# Patient Record
Sex: FEMALE | Race: WHITE | HISPANIC OR LATINO
[De-identification: names, ages, dates, MRNs, and addresses within clinical notes are randomized per-mention and may not be internally consistent; named-entity substitution may affect disease eponyms.]

---

## 2018-05-10 ENCOUNTER — TRANSCRIPTION ENCOUNTER (OUTPATIENT)
Age: 38
End: 2018-05-10

## 2018-12-05 ENCOUNTER — TRANSCRIPTION ENCOUNTER (OUTPATIENT)
Age: 38
End: 2018-12-05

## 2019-03-10 ENCOUNTER — TRANSCRIPTION ENCOUNTER (OUTPATIENT)
Age: 39
End: 2019-03-10

## 2019-10-23 ENCOUNTER — TRANSCRIPTION ENCOUNTER (OUTPATIENT)
Age: 39
End: 2019-10-23

## 2020-12-07 ENCOUNTER — TRANSCRIPTION ENCOUNTER (OUTPATIENT)
Age: 40
End: 2020-12-07

## 2020-12-12 ENCOUNTER — EMERGENCY (EMERGENCY)
Facility: HOSPITAL | Age: 40
LOS: 1 days | Discharge: HOME | End: 2020-12-12
Attending: EMERGENCY MEDICINE | Admitting: EMERGENCY MEDICINE
Payer: COMMERCIAL

## 2020-12-12 VITALS
OXYGEN SATURATION: 99 % | HEART RATE: 77 BPM | SYSTOLIC BLOOD PRESSURE: 126 MMHG | TEMPERATURE: 99 F | DIASTOLIC BLOOD PRESSURE: 76 MMHG | RESPIRATION RATE: 18 BRPM

## 2020-12-12 PROCEDURE — 73000 X-RAY EXAM OF COLLAR BONE: CPT | Mod: 26,LT

## 2020-12-12 PROCEDURE — 99283 EMERGENCY DEPT VISIT LOW MDM: CPT

## 2020-12-12 PROCEDURE — 73030 X-RAY EXAM OF SHOULDER: CPT | Mod: 26,LT

## 2020-12-12 PROCEDURE — 71101 X-RAY EXAM UNILAT RIBS/CHEST: CPT | Mod: 26,LT

## 2020-12-12 RX ORDER — ACETAMINOPHEN 500 MG
975 TABLET ORAL ONCE
Refills: 0 | Status: COMPLETED | OUTPATIENT
Start: 2020-12-12 | End: 2020-12-12

## 2020-12-12 RX ORDER — LIDOCAINE 4 G/100G
1 CREAM TOPICAL ONCE
Refills: 0 | Status: COMPLETED | OUTPATIENT
Start: 2020-12-12 | End: 2020-12-12

## 2020-12-12 RX ADMIN — LIDOCAINE 1 PATCH: 4 CREAM TOPICAL at 16:13

## 2020-12-12 RX ADMIN — Medication 975 MILLIGRAM(S): at 16:20

## 2020-12-12 NOTE — ED ADULT TRIAGE NOTE - CHIEF COMPLAINT QUOTE
Patient tripped and fell last Sunday and injured herself and patient has c/o back and bilateral upper shoulders.

## 2020-12-12 NOTE — ED PROVIDER NOTE - PATIENT PORTAL LINK FT
You can access the FollowMyHealth Patient Portal offered by [facility] by registering at the following website: [email or web address]. By joining [employer]’s FollowMyHealth portal, you will also be able to view your health information using other applications (apps) compatible with our system.

## 2020-12-12 NOTE — ED PROVIDER NOTE - NSFOLLOWUPINSTRUCTIONS_ED_ALL_ED_FT
Follow with your PMD within 48-72 hours.  Rest, no heavy lifting.  Warm compresses to area. Recommend Ortho consult to discuss possible MRI vs Physical Therapy- referral list provided.  Light walking. Take Motrin 600 mg every 8 hours for pain with food, may alternate with over the coutner tylenol as directed for pain. Any worsening pain, weakness, numbness, bowel or urinary incontinence return to ER

## 2020-12-12 NOTE — ED PROVIDER NOTE - NS CPE EDP MUSC CERVICAL LOC
no midline ttp, no deformity, + left paraspinal muscle ttp, FROM with reproducible mild pain on movement

## 2020-12-12 NOTE — ED PROVIDER NOTE - UPPER EXTREMITY EXAM, LEFT
TENDERNESS/no obvious swelling, erythema, or deformity, + bony TTP to upper anterior left sided ribs, shoulder, and clavicle, FROM shoulder/eblow/wrist, sensation and strength intact

## 2020-12-12 NOTE — ED ADULT NURSE REASSESSMENT NOTE - NS ED NURSE REASSESS COMMENT FT1
Intake pt stated fell at home on a wet floor few days ago, injured her left upper back, pt AOX 3 ambulatory to ER, medicated as ordered, pending dispo.       Pia Bass RN

## 2020-12-12 NOTE — ED PROVIDER NOTE - ATTENDING CONTRIBUTION TO CARE
I performed a face to face evaluation of this patient and obtained a history and performed a full exam.  I agree with the history, physical exam and plan of the PA.    Brief HPI:  41 y/o F PMH hypothyroidism c/o left sided shoulder/upper back pain s/p mechanical slip and fall 6 days ago.  Patient states that she sustained impact to her right side of body but has been having left upper back pain since incident.      Vitals:   Reviewed    Primary survey:   A:  airway intact, normal voice  B:  breath sounds clear bilaterally, symmetric chest rise, no flail segment  C:  peripheral pulses 2+ and symmetric, brisk capillary refill     Secondary Survey:    Gen:  AAOx3, non-toxic appearing, non-diaphoretic, speaking full sentences   Scalp:  no skull depression, hematoma, or laceration   Face:  no hematoma or laceration, non-tender sinuses and forehead, no mobile segments   Eyes:  no orbital swelling or tenderness bilaterally, no conjunctival injection, no proptosis, EOMI without pain, PERRL, no visual field abnormalities, no raccoon eyes   Nose:  non-swollen, non-tender, nares clear, no septal hematoma, no rhinorrhea   Ears:  no deformity or ecchymoses, no hemotympanum b/l, external auditory canals clear b/l, no carranza's sign, no otorrhea   Mouth:  lips and tongue normal appearing, teeth normal appearing, uvula midline, mucosa normal appearing without any laceration or visible bleeding   NECK:  no stridor, normal voice, no jvd, neck supple, no skin laceration   CV:  RRR, s1/s2, no murmur, rubs or gallops, peripheral pulses 2+ and symmetric, no JVD  PULM:  symmetric chest rise, lungs ctab, no wheeze, crackles, or rales, chest wall non-tender   ABD:  non-distended, non-tender, no rebound or guarding, no ecchymoses, no flank ecchymoses, no fluid shift   Spine:  no c/t/l spine tenderness or stepoff, left lower thoracic paraspinal tenderness   EXT:  no deformity, non-tender joints or extremities, rom grossly intact, warm to touch   Axilla:  no lacerations or wounds  NEURO:  AAOx3, GCS 15, motor 5/5 in ue and le b/l, sensation grossly intact in extremities and trunk, no gait deficit   SKIN:  warm, dry, no lacerations or ecchymoses     A/P:  41 y/o F PMH hypothyroidism c/o left sided shoulder/upper back pain s/p mechanical slip and fall 6 days ago.  VSS.  Exam with tenderness to palpation left thoracic paraspinal area.  Low c/f vertebral fracture or cord injury.  Plan for x-rays, pain control.  Dispo pending.

## 2020-12-12 NOTE — ED PROVIDER NOTE - CLINICAL SUMMARY MEDICAL DECISION MAKING FREE TEXT BOX
pt with pain to left upper chest/shoulder/back s/p fall; no neuro deficits on exam; will r/o fx to ribs/clavicle/shoulder and give pain control and reassess

## 2020-12-12 NOTE — ED PROVIDER NOTE - OBJECTIVE STATEMENT
41 y/o F PMH hypothyroidism c/o left sided shoulder/upper back pain s/p mechanical slip and fall 6 days ago. Pain radiates down lue and left back at times. Pt states she was cleaning kitchen floor when she fell, landing on her right side, without head trauma. Pt states she followed up with urgent care who rx'd her muscle relaxer but was unable to perform xray at the time. Denies fever, chills, HA, CP, SOB, cough, abdominal pain, N/V, numbness/weakness, bladder/bowel dysfunction.

## 2021-09-10 ENCOUNTER — TRANSCRIPTION ENCOUNTER (OUTPATIENT)
Age: 41
End: 2021-09-10

## 2021-10-08 ENCOUNTER — TRANSCRIPTION ENCOUNTER (OUTPATIENT)
Age: 41
End: 2021-10-08

## 2021-10-20 ENCOUNTER — TRANSCRIPTION ENCOUNTER (OUTPATIENT)
Age: 41
End: 2021-10-20

## 2021-10-22 ENCOUNTER — TRANSCRIPTION ENCOUNTER (OUTPATIENT)
Age: 41
End: 2021-10-22

## 2021-10-29 ENCOUNTER — TRANSCRIPTION ENCOUNTER (OUTPATIENT)
Age: 41
End: 2021-10-29

## 2021-11-05 ENCOUNTER — TRANSCRIPTION ENCOUNTER (OUTPATIENT)
Age: 41
End: 2021-11-05

## 2021-11-12 ENCOUNTER — TRANSCRIPTION ENCOUNTER (OUTPATIENT)
Age: 41
End: 2021-11-12

## 2021-11-17 ENCOUNTER — TRANSCRIPTION ENCOUNTER (OUTPATIENT)
Age: 41
End: 2021-11-17

## 2021-11-24 ENCOUNTER — TRANSCRIPTION ENCOUNTER (OUTPATIENT)
Age: 41
End: 2021-11-24

## 2021-11-26 ENCOUNTER — TRANSCRIPTION ENCOUNTER (OUTPATIENT)
Age: 41
End: 2021-11-26

## 2021-12-04 ENCOUNTER — TRANSCRIPTION ENCOUNTER (OUTPATIENT)
Age: 41
End: 2021-12-04

## 2022-01-10 ENCOUNTER — TRANSCRIPTION ENCOUNTER (OUTPATIENT)
Age: 42
End: 2022-01-10

## 2022-04-06 PROBLEM — E03.9 HYPOTHYROIDISM, UNSPECIFIED: Chronic | Status: ACTIVE | Noted: 2020-12-12

## 2022-05-06 ENCOUNTER — APPOINTMENT (OUTPATIENT)
Dept: NEPHROLOGY | Facility: CLINIC | Age: 42
End: 2022-05-06
Payer: COMMERCIAL

## 2022-05-06 ENCOUNTER — LABORATORY RESULT (OUTPATIENT)
Age: 42
End: 2022-05-06

## 2022-05-06 DIAGNOSIS — Z78.9 OTHER SPECIFIED HEALTH STATUS: ICD-10-CM

## 2022-05-06 PROCEDURE — 93000 ELECTROCARDIOGRAM COMPLETE: CPT

## 2022-05-06 PROCEDURE — 99204 OFFICE O/P NEW MOD 45 MIN: CPT | Mod: 25

## 2022-05-06 PROCEDURE — 36415 COLL VENOUS BLD VENIPUNCTURE: CPT

## 2022-05-06 RX ORDER — NORETHINDRONE ACETATE AND ETHINYL ESTRADIOL 1.5; 3 MG/1; UG/1
1.5-3 TABLET ORAL DAILY
Qty: 90 | Refills: 0 | Status: ACTIVE | COMMUNITY
Start: 2022-05-06

## 2022-05-06 NOTE — ASSESSMENT
[FreeTextEntry1] : # SOB.\par * Check labs.\par * Pulm referral. They were instructed that this referral is important for their health and that it is their responsibility to make and keep this appointment. All their questions were answered. \par * Check EKG (NSR, no STT abnl.). She will make appointment to be evaluated by cardiologist. They were instructed that this referral is important for their health and that it is their responsibility to make and keep this appointment. All their questions were answered.\par * Follow up in 1 month. \par \par # Hypothyroid.\par * TSH.\par \par #  Overweight.\par * Weight loss.\par \par # Snoring.\par * Sleep study eval.

## 2022-05-06 NOTE — PHYSICAL EXAM
[General Appearance - Alert] : alert [General Appearance - In No Acute Distress] : in no acute distress [Neck Appearance] : the appearance of the neck was normal [Neck Cervical Mass (___cm)] : no neck mass was observed [Jugular Venous Distention Increased] : there was no jugular-venous distention [Thyroid Diffuse Enlargement] : the thyroid was not enlarged [Thyroid Nodule] : there were no palpable thyroid nodules [Auscultation Breath Sounds / Voice Sounds] : lungs were clear to auscultation bilaterally [Heart Rate And Rhythm] : heart rate was normal and rhythm regular [Heart Sounds] : normal S1 and S2 [Heart Sounds Gallop] : no gallops [Murmurs] : no murmurs [Heart Sounds Pericardial Friction Rub] : no pericardial rub [Edema] : there was no peripheral edema [Bowel Sounds] : normal bowel sounds [Abdomen Soft] : soft [Abdomen Tenderness] : non-tender [] : no hepato-splenomegaly [Abdomen Mass (___ Cm)] : no abdominal mass palpated [Cervical Lymph Nodes Enlarged Posterior Bilaterally] : posterior cervical [Cervical Lymph Nodes Enlarged Anterior Bilaterally] : anterior cervical [Supraclavicular Lymph Nodes Enlarged Bilaterally] : supraclavicular [Axillary Lymph Nodes Enlarged Bilaterally] : axillary [Femoral Lymph Nodes Enlarged Bilaterally] : femoral [Inguinal Lymph Nodes Enlarged Bilaterally] : inguinal

## 2022-05-06 NOTE — HISTORY OF PRESENT ILLNESS
[FreeTextEntry1] : Previous PCP: Dr. Kramer. . \par \par For 6 months she has noticed SOB with climbing steps at work (three flights). No cough or CP. \par This also happened two years ago and she was found to be iron deficient and severely anemic due to heavy periods. She follows with her hematologist every 6 months. She was treated empirically for asthma with albuterol  by previous PCP and she doesn't believe this helped. * Snoring. \par \par No heavy periods. On birth control. No smoking. No early CAD. \par \par She sees a cardiologist for a "slight heart murmur". She had an annual visit 5 months ago and she feels she told them of her shortnesss of breath. Stress test scheduled. \par \par Options for clinical preventative services\par \par Covid: Pfizer x 2 \par Influenza: No\par Pneumonia: \par Shingles:\par TDAP:\par Colonoscopy:\par Dermatologist:\par \par Breast/Pelvic Exam: 2021  by gyn \par Mammogram: 2021 by gyn\par Bone Density >65:\par \par HIV:\par Hep C:\par \par \par

## 2022-05-13 LAB
ALBUMIN SERPL ELPH-MCNC: 3.9 G/DL
ALP BLD-CCNC: 62 U/L
ALT SERPL-CCNC: 13 U/L
ANION GAP SERPL CALC-SCNC: 13 MMOL/L
APPEARANCE: ABNORMAL
AST SERPL-CCNC: 26 U/L
BASOPHILS # BLD AUTO: 0.02 K/UL
BASOPHILS NFR BLD AUTO: 0.4 %
BILIRUB SERPL-MCNC: <0.2 MG/DL
BILIRUBIN URINE: NEGATIVE
BLOOD URINE: ABNORMAL
BUN SERPL-MCNC: 11 MG/DL
CALCIUM SERPL-MCNC: 8.7 MG/DL
CHLORIDE SERPL-SCNC: 105 MMOL/L
CHOLEST SERPL-MCNC: 213 MG/DL
CO2 SERPL-SCNC: 21 MMOL/L
COLOR: ABNORMAL
CREAT SERPL-MCNC: 0.69 MG/DL
CREAT SPEC-SCNC: 132 MG/DL
EGFR: 110 ML/MIN/1.73M2
EOSINOPHIL # BLD AUTO: 0.11 K/UL
EOSINOPHIL NFR BLD AUTO: 2.4 %
ESTIMATED AVERAGE GLUCOSE: 111 MG/DL
FERRITIN SERPL-MCNC: 16 NG/ML
FOLATE SERPL-MCNC: 19.1 NG/ML
GLUCOSE QUALITATIVE U: NEGATIVE
GLUCOSE SERPL-MCNC: 63 MG/DL
HBA1C MFR BLD HPLC: 5.5 %
HCT VFR BLD CALC: 41.1 %
HCYS SERPL-MCNC: 6.3 UMOL/L
HDLC SERPL-MCNC: 49 MG/DL
HGB BLD-MCNC: 11.4 G/DL
IMM GRANULOCYTES NFR BLD AUTO: 0.2 %
IRON SATN MFR SERPL: 7 %
IRON SERPL-MCNC: 29 UG/DL
KETONES URINE: NEGATIVE
LDLC SERPL CALC-MCNC: 112 MG/DL
LEUKOCYTE ESTERASE URINE: ABNORMAL
LYMPHOCYTES # BLD AUTO: 1.45 K/UL
LYMPHOCYTES NFR BLD AUTO: 31.5 %
MAN DIFF?: NORMAL
MCHC RBC-ENTMCNC: 27.7 GM/DL
MCHC RBC-ENTMCNC: 29.4 PG
MCV RBC AUTO: 105.9 FL
MICROALBUMIN 24H UR DL<=1MG/L-MCNC: 9.2 MG/DL
MICROALBUMIN/CREAT 24H UR-RTO: 70 MG/G
MONOCYTES # BLD AUTO: 0.43 K/UL
MONOCYTES NFR BLD AUTO: 9.3 %
NEUTROPHILS # BLD AUTO: 2.58 K/UL
NEUTROPHILS NFR BLD AUTO: 56.2 %
NITRITE URINE: POSITIVE
NONHDLC SERPL-MCNC: 164 MG/DL
PH URINE: 7.5
PLATELET # BLD AUTO: 381 K/UL
POTASSIUM SERPL-SCNC: 4.8 MMOL/L
PROT SERPL-MCNC: 6.4 G/DL
PROTEIN URINE: ABNORMAL
RBC # BLD: 3.88 M/UL
RBC # BLD: 3.88 M/UL
RBC # FLD: 14.3 %
RETICS # AUTO: 1.9 %
RETICS AGGREG/RBC NFR: 75.3 K/UL
SODIUM SERPL-SCNC: 140 MMOL/L
SPECIFIC GRAVITY URINE: 1.02
TIBC SERPL-MCNC: 418 UG/DL
TRIGL SERPL-MCNC: 259 MG/DL
TSH SERPL-ACNC: 1.02 UIU/ML
UIBC SERPL-MCNC: 390 UG/DL
UROBILINOGEN URINE: NORMAL
VIT B12 SERPL-MCNC: 426 PG/ML
WBC # FLD AUTO: 4.6 K/UL

## 2022-05-15 ENCOUNTER — NON-APPOINTMENT (OUTPATIENT)
Age: 42
End: 2022-05-15

## 2022-05-17 ENCOUNTER — NON-APPOINTMENT (OUTPATIENT)
Age: 42
End: 2022-05-17

## 2022-05-19 LAB — METHYLMALONATE SERPL-SCNC: 140 NMOL/L

## 2022-05-22 ENCOUNTER — NON-APPOINTMENT (OUTPATIENT)
Age: 42
End: 2022-05-22

## 2022-05-28 ENCOUNTER — NON-APPOINTMENT (OUTPATIENT)
Age: 42
End: 2022-05-28

## 2022-06-10 ENCOUNTER — APPOINTMENT (OUTPATIENT)
Dept: NEPHROLOGY | Facility: CLINIC | Age: 42
End: 2022-06-10

## 2022-07-06 ENCOUNTER — APPOINTMENT (OUTPATIENT)
Dept: PULMONOLOGY | Facility: CLINIC | Age: 42
End: 2022-07-06

## 2022-07-06 VITALS
DIASTOLIC BLOOD PRESSURE: 64 MMHG | HEIGHT: 61 IN | OXYGEN SATURATION: 99 % | TEMPERATURE: 97.5 F | SYSTOLIC BLOOD PRESSURE: 115 MMHG | BODY MASS INDEX: 28.32 KG/M2 | HEART RATE: 76 BPM | WEIGHT: 150 LBS

## 2022-07-06 PROCEDURE — 99204 OFFICE O/P NEW MOD 45 MIN: CPT

## 2022-07-06 RX ORDER — BROMPHENIRAMINE MALEATE, PSEUDOEPHEDRINE HYDROCHLORIDE, 2; 30; 10 MG/5ML; MG/5ML; MG/5ML
30-2-10 SYRUP ORAL
Qty: 200 | Refills: 0 | Status: DISCONTINUED | COMMUNITY
Start: 2022-05-21

## 2022-07-06 RX ORDER — AZITHROMYCIN 250 MG/1
250 TABLET, FILM COATED ORAL
Qty: 6 | Refills: 0 | Status: DISCONTINUED | COMMUNITY
Start: 2022-05-24

## 2022-07-06 RX ORDER — BENZONATATE 100 MG/1
100 CAPSULE ORAL
Qty: 30 | Refills: 0 | Status: DISCONTINUED | COMMUNITY
Start: 2022-05-25

## 2022-07-06 RX ORDER — BLOOD SUGAR DIAGNOSTIC
STRIP MISCELLANEOUS
Qty: 100 | Refills: 0 | Status: DISCONTINUED | COMMUNITY
Start: 2022-02-04

## 2022-07-06 RX ORDER — ALBUTEROL SULFATE 90 UG/1
108 (90 BASE) INHALANT RESPIRATORY (INHALATION)
Qty: 8 | Refills: 0 | Status: DISCONTINUED | COMMUNITY
Start: 2022-01-31

## 2022-07-06 RX ORDER — NORETHINDRONE ACETATE AND ETHINYL ESTRADIOL AND FERROUS FUMARATE 1MG-20(21)
1-20 KIT ORAL
Qty: 84 | Refills: 0 | Status: DISCONTINUED | COMMUNITY
Start: 2021-10-01

## 2022-07-06 RX ORDER — LANCETS 33 GAUGE
EACH MISCELLANEOUS
Qty: 100 | Refills: 0 | Status: DISCONTINUED | COMMUNITY
Start: 2022-01-31

## 2022-07-06 RX ORDER — GUAIFENESIN 600 MG/1
600 TABLET, EXTENDED RELEASE ORAL
Qty: 14 | Refills: 0 | Status: DISCONTINUED | COMMUNITY
Start: 2022-05-23

## 2022-07-06 NOTE — REVIEW OF SYSTEMS
[Fatigue] : fatigue [Dyspnea] : dyspnea [SOB on Exertion] : sob on exertion [Fever] : no fever [Chills] : no chills [Cough] : no cough [Sputum] : no sputum [Chest Discomfort] : no chest discomfort [Orthopnea] : no orthopnea [Syncope] : no syncope

## 2022-07-06 NOTE — CONSULT LETTER
[Dear  ___] : Dear  [unfilled], [Consult Letter:] : I had the pleasure of evaluating your patient, [unfilled]. [Please see my note below.] : Please see my note below. [Consult Closing:] : Thank you very much for allowing me to participate in the care of this patient.  If you have any questions, please do not hesitate to contact me. [Sincerely,] : Sincerely, [FreeTextEntry3] : Pia Lopes MD\par \par [employer] at [facility]\par Pulmonary, Critical Care, and Sleep Medicine\par

## 2022-07-06 NOTE — ASSESSMENT
[FreeTextEntry1] : DATA REVIEWED\par CXR [facility] 12/2020: no obvious pathology\par \par Plan:\par 41F with no prior pulmonary history presents with dyspnea on exertion. Differential includes underlying obstructive or restrictive lung disease vs deconditioning. Had a negative cardiac evaluation. Will obtain PFTs, 6MWT. HST ordered to evaluate for SHARMILA given symptoms. Follow up after PFTs/HST, if all normal we can consider CPET to evaluate her dyspnea.

## 2022-07-06 NOTE — HISTORY OF PRESENT ILLNESS
[< 20 pack-years] : < 20 pack-years [Former] : former [TextBox_4] : 7/6/22: Initial consultation in a 41F with history of iron deficiency anemia and hypothyroidism referred for shortness of breath x 1 year. No history of SOB, now with ZULETA with walking more than 10 blocks or with climbing 4 flights of stairs. No chest pain. Had bronchitis in Dec 2021, for which she went to urgent care x4 and received PO antibiotics course. No COVID infection, vaccinated x3. No childhood asthma, no history of pneumonias, non smoker, no birds. 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 Some weight gain. Cardiologist Dr. Sandeep Obrien, echo in June 2022 normal per patient. Sleep symptoms include snoring, not feeling well rested, napping during the day. No LE swelling, no orthopnea. Smoked socially in high school and college. No vaping, no e-cigarettes. [TextBox_11] : 0.3 [TextBox_13] : 4 [ESS] : 9

## 2022-07-22 ENCOUNTER — APPOINTMENT (OUTPATIENT)
Dept: SLEEP CENTER | Facility: HOME HEALTH | Age: 42
End: 2022-07-22

## 2022-07-22 ENCOUNTER — OUTPATIENT (OUTPATIENT)
Dept: OUTPATIENT SERVICES | Facility: HOSPITAL | Age: 42
LOS: 1 days | End: 2022-07-22
Payer: COMMERCIAL

## 2022-07-22 ENCOUNTER — NON-APPOINTMENT (OUTPATIENT)
Age: 42
End: 2022-07-22

## 2022-07-22 PROCEDURE — 95800 SLP STDY UNATTENDED: CPT

## 2022-07-22 PROCEDURE — 95800 SLP STDY UNATTENDED: CPT | Mod: 26

## 2022-07-25 DIAGNOSIS — G47.33 OBSTRUCTIVE SLEEP APNEA (ADULT) (PEDIATRIC): ICD-10-CM

## 2022-07-27 ENCOUNTER — APPOINTMENT (OUTPATIENT)
Dept: PULMONOLOGY | Facility: CLINIC | Age: 42
End: 2022-07-27

## 2022-07-27 ENCOUNTER — APPOINTMENT (OUTPATIENT)
Dept: NEPHROLOGY | Facility: CLINIC | Age: 42
End: 2022-07-27

## 2022-07-27 ENCOUNTER — RESULT REVIEW (OUTPATIENT)
Age: 42
End: 2022-07-27

## 2022-07-27 VITALS — SYSTOLIC BLOOD PRESSURE: 111 MMHG | DIASTOLIC BLOOD PRESSURE: 67 MMHG | HEART RATE: 66 BPM

## 2022-07-27 PROCEDURE — 94726 PLETHYSMOGRAPHY LUNG VOLUMES: CPT

## 2022-07-27 PROCEDURE — 94618 PULMONARY STRESS TESTING: CPT

## 2022-07-27 PROCEDURE — 94060 EVALUATION OF WHEEZING: CPT

## 2022-07-27 PROCEDURE — 94729 DIFFUSING CAPACITY: CPT

## 2022-07-27 PROCEDURE — 99214 OFFICE O/P EST MOD 30 MIN: CPT

## 2022-07-27 NOTE — HISTORY OF PRESENT ILLNESS
[FreeTextEntry1] : Previous PCP: Dr. Kramer. . \par \par * Following up with Dr. SEAY for SOB with stair climbing. Sleep apnea evaluation. * She is also following up with cardiologist. * HGB 11.4. Following up with hematologist. * She had her period last visit with hematuria. She believes she may have had hematuria and/or her period previously. She has her period today. 70 mg albuminuria. \par \par Previous history (06May22): For 6 months she has noticed SOB with climbing steps at work (three flights). No cough or CP.  This also happened two years ago and she was found to be iron deficient and severely anemic due to heavy periods. She follows with her hematologist every 6 months. She was treated empirically for asthma with albuterol  by previous PCP and she doesn't believe this helped. * Snoring. \par \par No heavy periods. On birth control. No smoking. No early CAD. \par \par She sees a cardiologist for a "slight heart murmur". She had an annual visit 5 months ago and she feels she told them of her shortnesss of breath. Stress test scheduled. \par \par Options for clinical preventative services\par \par Covid: Pfizer x 2 \par Influenza: No\par Pneumonia: \par Shingles:\par TDAP:\par Colonoscopy:\par Dermatologist:\par \par Breast/Pelvic Exam: 2021  by gyn \par Mammogram: 2021 by gyn\par Bone Density >65:\par \par HIV:\par Hep C:\par \par \par

## 2022-07-27 NOTE — ASSESSMENT
[FreeTextEntry1] : # Possible hematuria and possible albuminuria. Per patient, this has occurred several times.\par * Recheck U/A, labs, including GN workup (when she is not having her period). \par * Check renal ultrasound.\par * Follow up in 2 - 3 months. \par \par # SOB/snoring.\par * Follow up with pulm/cardiology.\par \par # Iron def anemia.\par * Follow up with heme.\par \par # Overweight.\par * Weight loss.\par \par \par \par

## 2022-07-29 ENCOUNTER — APPOINTMENT (OUTPATIENT)
Dept: PULMONOLOGY | Facility: CLINIC | Age: 42
End: 2022-07-29

## 2022-07-29 PROCEDURE — 99443: CPT

## 2022-07-30 ENCOUNTER — OUTPATIENT (OUTPATIENT)
Dept: OUTPATIENT SERVICES | Facility: HOSPITAL | Age: 42
LOS: 1 days | End: 2022-07-30
Payer: COMMERCIAL

## 2022-07-30 ENCOUNTER — APPOINTMENT (OUTPATIENT)
Dept: ULTRASOUND IMAGING | Facility: HOSPITAL | Age: 42
End: 2022-07-30

## 2022-07-30 PROCEDURE — 76770 US EXAM ABDO BACK WALL COMP: CPT | Mod: 26

## 2022-07-30 PROCEDURE — 76770 US EXAM ABDO BACK WALL COMP: CPT

## 2022-08-01 NOTE — HISTORY OF PRESENT ILLNESS
[Home] : at home, [unfilled] , at the time of the visit. [Medical Office: ([facility])___] : at the medical office located in  [Verbal consent obtained from patient] : the patient, [unfilled] [Never] : never [Former] : former [TextBox_4] : 7/6/22: Initial consultation in a 41F with history of iron deficiency anemia and hypothyroidism referred for shortness of breath x 1 year. No history of SOB, now with ZULETA with walking more than 10 blocks or with climbing 4 flights of stairs. No chest pain. Had bronchitis in Dec 2021, for which she went to urgent care x4 and received PO antibiotics course. No COVID infection, vaccinated x3. No childhood asthma, no history of pneumonias, non smoker, no birds. 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 Some weight gain. Cardiologist Dr. Sandeep Obrien, echo in June 2022 normal per patient. Sleep symptoms include snoring, not feeling well rested, napping during the day. No LE swelling, no orthopnea. Smoked socially in high school and college. No vaping, no e-cigarettes. \par \par 7/29/2022\par Had PFTs and HST.  [TextBox_13] : 4 [TextBox_11] : 0.3 [ESS] : 9

## 2022-08-01 NOTE — CONSULT LETTER
[Dear  ___] : Dear  [unfilled], [Courtesy Letter:] : I had the pleasure of seeing your patient, [unfilled], in my office today. [Please see my note below.] : Please see my note below. [Consult Closing:] : Thank you very much for allowing me to participate in the care of this patient.  If you have any questions, please do not hesitate to contact me. [Sincerely,] : Sincerely, [FreeTextEntry3] : Pia Lopes MD\par \par [employer] at [facility]\par Pulmonary, Critical Care, and Sleep Medicine\par

## 2022-08-01 NOTE — ASSESSMENT
[FreeTextEntry1] : DATA REVIEWED\par 1. CXR [facility] 12/2020: no obvious pathology\par 2. PFTs 7/2022: FEV1 79, FEV1/FVC 80, TLC 87, ERV 41, DLCO 64\par 3. 6MWT 7/2022: no desaturation; 579 meters walked\par 4. HST 7/2022: AHI 0.3 4%; T88 0\par \par Plan:\par 41F with no prior pulmonary history presents with dyspnea on exertion. Differential includes underlying obstructive or restrictive lung disease vs deconditioning. Had a negative cardiac evaluation. CXR done previously and is negative for pathology. PFTs and 6MWT unremarkable. Can consider a CPET but for now she will increase exercise capacity and monitor symptoms to exclude deconditioning as the cause. HST negative for SHARMILA. However due to discrepancy between symptoms and HST results will refer for PSG. Follow up after PSG is resulted.

## 2022-08-12 ENCOUNTER — NON-APPOINTMENT (OUTPATIENT)
Age: 42
End: 2022-08-12

## 2022-08-13 ENCOUNTER — LABORATORY RESULT (OUTPATIENT)
Age: 42
End: 2022-08-13

## 2022-08-13 LAB
ALBUMIN SERPL ELPH-MCNC: 4 G/DL
ALP BLD-CCNC: 53 U/L
ALT SERPL-CCNC: 10 U/L
ANION GAP SERPL CALC-SCNC: 12 MMOL/L
AST SERPL-CCNC: 14 U/L
BASOPHILS # BLD AUTO: 0.03 K/UL
BASOPHILS NFR BLD AUTO: 0.5 %
BILIRUB SERPL-MCNC: 0.2 MG/DL
BUN SERPL-MCNC: 10 MG/DL
CALCIUM SERPL-MCNC: 9.4 MG/DL
CHLORIDE SERPL-SCNC: 104 MMOL/L
CO2 SERPL-SCNC: 22 MMOL/L
CREAT SERPL-MCNC: 0.74 MG/DL
CREAT SPEC-SCNC: 210 MG/DL
CREAT SPEC-SCNC: 210 MG/DL
CREAT/PROT UR: 0.1 RATIO
CRP SERPL-MCNC: 4 MG/L
CYSTATIN C SERPL-MCNC: 0.63 MG/L
EGFR: 102 ML/MIN/1.73M2
EOSINOPHIL # BLD AUTO: 0.07 K/UL
EOSINOPHIL NFR BLD AUTO: 1.1 %
GFR/BSA.PRED SERPLBLD CYS-BASED-ARV: 117 ML/MIN/1.73M2
GLUCOSE SERPL-MCNC: 88 MG/DL
HBV SURFACE AG SER QL: NONREACTIVE
HCT VFR BLD CALC: 36.2 %
HCV AB SER QL: NONREACTIVE
HCV S/CO RATIO: 0.1 S/CO
HGB BLD-MCNC: 11.3 G/DL
HIV1+2 AB SPEC QL IA.RAPID: NONREACTIVE
IMM GRANULOCYTES NFR BLD AUTO: 0.2 %
LYMPHOCYTES # BLD AUTO: 1.25 K/UL
LYMPHOCYTES NFR BLD AUTO: 20.4 %
MAN DIFF?: NORMAL
MCHC RBC-ENTMCNC: 28 PG
MCHC RBC-ENTMCNC: 31.2 GM/DL
MCV RBC AUTO: 89.8 FL
MICROALBUMIN 24H UR DL<=1MG/L-MCNC: 8.1 MG/DL
MICROALBUMIN/CREAT 24H UR-RTO: 39 MG/G
MONOCYTES # BLD AUTO: 0.37 K/UL
MONOCYTES NFR BLD AUTO: 6 %
NEUTROPHILS # BLD AUTO: 4.41 K/UL
NEUTROPHILS NFR BLD AUTO: 71.8 %
PLATELET # BLD AUTO: 374 K/UL
POTASSIUM SERPL-SCNC: 4.7 MMOL/L
PROT SERPL-MCNC: 6.9 G/DL
PROT UR-MCNC: 25 MG/DL
RBC # BLD: 4.03 M/UL
RBC # FLD: 15.3 %
RHEUMATOID FACT SER QL: <10 IU/ML
SODIUM SERPL-SCNC: 138 MMOL/L
WBC # FLD AUTO: 6.14 K/UL

## 2022-08-14 LAB
APPEARANCE: ABNORMAL
BACTERIA: NEGATIVE
BILIRUBIN URINE: NEGATIVE
BLOOD URINE: ABNORMAL
C3 SERPL-MCNC: 149 MG/DL
C4 SERPL-MCNC: 28 MG/DL
COLOR: YELLOW
GLUCOSE QUALITATIVE U: NEGATIVE
HYALINE CASTS: 0 /LPF
KETONES URINE: NEGATIVE
LEUKOCYTE ESTERASE URINE: NEGATIVE
MICROSCOPIC-UA: NORMAL
NITRITE URINE: NEGATIVE
PH URINE: 5.5
PROTEIN URINE: NORMAL
RED BLOOD CELLS URINE: 5 /HPF
SPECIFIC GRAVITY URINE: >=1.03
SQUAMOUS EPITHELIAL CELLS: 5 /HPF
UROBILINOGEN URINE: NORMAL
WHITE BLOOD CELLS URINE: 1 /HPF

## 2022-08-15 LAB — DSDNA AB SER-ACNC: <12 IU/ML

## 2022-08-17 LAB
ENA RNP AB SER IA-ACNC: 0.4 AL
ENA SM AB SER IA-ACNC: <0.2 AL

## 2022-08-18 LAB
ANA PAT FLD IF-IMP: NORMAL
ANACR T: ABNORMAL
GBM AB TITR SER IF: <0.2

## 2022-08-19 ENCOUNTER — NON-APPOINTMENT (OUTPATIENT)
Age: 42
End: 2022-08-19

## 2022-08-19 LAB
ALBUPE: 45 %
ALPHA1UPE: 22.9 %
ALPHA2UPE: 13.3 %
BETAUPE: 10.4 %
GAMMAUPE: 8.4 %
IGA 24H UR QL IFE: NORMAL
KAPPA LC 24H UR QL: NORMAL
PROT PATTERN 24H UR ELPH-IMP: NORMAL
PROT UR-MCNC: 26 MG/DL
PROT UR-MCNC: 26 MG/DL

## 2022-08-29 LAB
ALBUMIN MFR SERPL ELPH: 54.8 %
ALBUMIN SERPL-MCNC: 3.8 G/DL
ALBUMIN/GLOB SERPL: 1.2 RATIO
ALPHA1 GLOB MFR SERPL ELPH: 5.1 %
ALPHA1 GLOB SERPL ELPH-MCNC: 0.4 G/DL
ALPHA2 GLOB MFR SERPL ELPH: 11.4 %
ALPHA2 GLOB SERPL ELPH-MCNC: 0.8 G/DL
B-GLOBULIN MFR SERPL ELPH: 14 %
B-GLOBULIN SERPL ELPH-MCNC: 1 G/DL
DEPRECATED KAPPA LC FREE/LAMBDA SER: 1.46 RATIO
GAMMA GLOB FLD ELPH-MCNC: 1 G/DL
GAMMA GLOB MFR SERPL ELPH: 14.7 %
IGA SER QL IEP: 214 MG/DL
IGG SER QL IEP: 894 MG/DL
IGM SER QL IEP: 160 MG/DL
INTERPRETATION SERPL IEP-IMP: NORMAL
KAPPA LC CSF-MCNC: 0.95 MG/DL
KAPPA LC SERPL-MCNC: 1.39 MG/DL
M PROTEIN SPEC IFE-MCNC: NORMAL
PROT SERPL-MCNC: 6.9 G/DL
PROT SERPL-MCNC: 6.9 G/DL

## 2022-09-21 ENCOUNTER — NON-APPOINTMENT (OUTPATIENT)
Age: 42
End: 2022-09-21

## 2022-11-09 ENCOUNTER — APPOINTMENT (OUTPATIENT)
Dept: NEPHROLOGY | Facility: CLINIC | Age: 42
End: 2022-11-09

## 2022-11-14 ENCOUNTER — APPOINTMENT (OUTPATIENT)
Dept: DERMATOLOGY | Facility: CLINIC | Age: 42
End: 2022-11-14

## 2022-12-12 ENCOUNTER — RX RENEWAL (OUTPATIENT)
Age: 42
End: 2022-12-12

## 2023-01-19 ENCOUNTER — APPOINTMENT (OUTPATIENT)
Dept: NEPHROLOGY | Facility: CLINIC | Age: 43
End: 2023-01-19
Payer: SELF-PAY

## 2023-01-19 VITALS — BODY MASS INDEX: 29.29 KG/M2 | WEIGHT: 155 LBS

## 2023-01-19 DIAGNOSIS — R06.83 SNORING: ICD-10-CM

## 2023-01-19 PROCEDURE — 99213 OFFICE O/P EST LOW 20 MIN: CPT

## 2023-01-19 NOTE — ASSESSMENT
[FreeTextEntry1] : # Possible hematuria and possible albuminuria. Per patient, this has occurred several times. However, it is uncertain whether she had her period for most/all of these times. \par * Recheck U/A, labs. (She will have this performed in an outside lab when she has insurance.) \par * Follow up in 2 - 3 months. \par \par # SOB/snoring.\par * Follow up with pulm/cardiology.\par \par # Iron def anemia.\par * Follow up with heme.\par * I have also advised her to have a colonoscopy. (Bleeding has been attributed to heavy periods.) \par \par # Overweight.\par * Weight loss.\par \par

## 2023-01-19 NOTE — HISTORY OF PRESENT ILLNESS
[FreeTextEntry1] : Previous PCP: Dr. Kramer. . \par \par * Following up with Dr. Seay for breathing issues. * Hematuria previously but she was having her period (likely false positive). US normal. * Borderline anemia (HGB 11.3) with iron deficient anemia for which she has followed up with heme. \par \par Previous history ([id number]): * Following up with Dr. SEAY for SOB with stair climbing. Sleep apnea evaluation. * She is also following up with cardiologist. * HGB 11.4. Following up with hematologist. * She had her period last visit with hematuria. She believes she may have had hematuria and/or her period previously. She has her period today. 70 mg albuminuria. \par \par Previous history (06May22): For 6 months she has noticed SOB with climbing steps at work (three flights). No cough or CP.  This also happened two years ago and she was found to be iron deficient and severely anemic due to heavy periods. She follows with her hematologist every 6 months. She was treated empirically for asthma with albuterol  by previous PCP and she doesn't believe this helped. * Snoring. \par \par No heavy periods. On birth control. No smoking. No early CAD. \par \par She sees a cardiologist for a "slight heart murmur". She had an annual visit 5 months ago and she feels she told them of her shortnesss of breath. Stress test scheduled. \par \par Options for clinical preventative services\par \par Covid: Pfizer x 2 \par Influenza: No\par Pneumonia: \par Shingles:\par TDAP:\par Colonoscopy:\par Dermatologist:\par \par Breast/Pelvic Exam: 2021  by gyn \par Mammogram: 2021 by gyn\par Bone Density >65:\par \par HIV:\par Hep C:\par \par \par

## 2023-02-28 ENCOUNTER — NON-APPOINTMENT (OUTPATIENT)
Age: 43
End: 2023-02-28

## 2023-03-08 DIAGNOSIS — E66.3 OVERWEIGHT: ICD-10-CM

## 2023-03-08 DIAGNOSIS — R06.02 SHORTNESS OF BREATH: ICD-10-CM

## 2023-03-08 DIAGNOSIS — R01.1 CARDIAC MURMUR, UNSPECIFIED: ICD-10-CM

## 2023-04-17 ENCOUNTER — LABORATORY RESULT (OUTPATIENT)
Age: 43
End: 2023-04-17

## 2023-04-17 LAB
ALBUMIN SERPL ELPH-MCNC: 4.1 G/DL
ALP BLD-CCNC: 53 U/L
ALT SERPL-CCNC: 13 U/L
ANION GAP SERPL CALC-SCNC: 10 MMOL/L
AST SERPL-CCNC: 15 U/L
BASOPHILS # BLD AUTO: 0.03 K/UL
BASOPHILS NFR BLD AUTO: 0.7 %
BILIRUB SERPL-MCNC: <0.2 MG/DL
BUN SERPL-MCNC: 9 MG/DL
CALCIUM SERPL-MCNC: 9.5 MG/DL
CHLORIDE SERPL-SCNC: 107 MMOL/L
CHOLEST SERPL-MCNC: 188 MG/DL
CO2 SERPL-SCNC: 24 MMOL/L
CREAT SERPL-MCNC: 0.64 MG/DL
EGFR: 112 ML/MIN/1.73M2
EOSINOPHIL # BLD AUTO: 0.09 K/UL
EOSINOPHIL NFR BLD AUTO: 2.1 %
ESTIMATED AVERAGE GLUCOSE: 103 MG/DL
FERRITIN SERPL-MCNC: 181 NG/ML
GLUCOSE SERPL-MCNC: 97 MG/DL
HBA1C MFR BLD HPLC: 5.2 %
HCT VFR BLD CALC: 35.8 %
HDLC SERPL-MCNC: 50 MG/DL
HGB BLD-MCNC: 11.2 G/DL
IMM GRANULOCYTES NFR BLD AUTO: 0 %
LDLC SERPL CALC-MCNC: 99 MG/DL
LYMPHOCYTES # BLD AUTO: 1.16 K/UL
LYMPHOCYTES NFR BLD AUTO: 27.4 %
MAN DIFF?: NORMAL
MCHC RBC-ENTMCNC: 28.6 PG
MCHC RBC-ENTMCNC: 31.3 GM/DL
MCV RBC AUTO: 91.3 FL
MONOCYTES # BLD AUTO: 0.34 K/UL
MONOCYTES NFR BLD AUTO: 8 %
NEUTROPHILS # BLD AUTO: 2.62 K/UL
NEUTROPHILS NFR BLD AUTO: 61.8 %
NONHDLC SERPL-MCNC: 138 MG/DL
PLATELET # BLD AUTO: 329 K/UL
POTASSIUM SERPL-SCNC: 4.5 MMOL/L
PROT SERPL-MCNC: 6.3 G/DL
RBC # BLD: 3.92 M/UL
RBC # FLD: 18.6 %
SODIUM SERPL-SCNC: 140 MMOL/L
TRIGL SERPL-MCNC: 198 MG/DL
TSH SERPL-ACNC: 2.23 UIU/ML
VIT B12 SERPL-MCNC: 313 PG/ML
WBC # FLD AUTO: 4.24 K/UL

## 2023-04-18 LAB
APPEARANCE: CLEAR
BILIRUBIN URINE: NEGATIVE
BLOOD URINE: ABNORMAL
COLOR: NORMAL
CREAT SPEC-SCNC: 56 MG/DL
GLUCOSE QUALITATIVE U: NEGATIVE
KETONES URINE: NEGATIVE
LEUKOCYTE ESTERASE URINE: NEGATIVE
MICROALBUMIN 24H UR DL<=1MG/L-MCNC: 2.6 MG/DL
MICROALBUMIN/CREAT 24H UR-RTO: 47 MG/G
NITRITE URINE: NEGATIVE
PH URINE: 6
PROTEIN URINE: NORMAL
SPECIFIC GRAVITY URINE: 1.01
UROBILINOGEN URINE: NORMAL

## 2023-05-24 DIAGNOSIS — N39.0 URINARY TRACT INFECTION, SITE NOT SPECIFIED: ICD-10-CM

## 2023-09-07 ENCOUNTER — APPOINTMENT (OUTPATIENT)
Dept: NEPHROLOGY | Facility: CLINIC | Age: 43
End: 2023-09-07

## 2023-11-01 ENCOUNTER — NON-APPOINTMENT (OUTPATIENT)
Age: 43
End: 2023-11-01

## 2023-11-05 ENCOUNTER — LABORATORY RESULT (OUTPATIENT)
Age: 43
End: 2023-11-05

## 2023-11-06 ENCOUNTER — APPOINTMENT (OUTPATIENT)
Dept: NEPHROLOGY | Facility: CLINIC | Age: 43
End: 2023-11-06
Payer: MEDICAID

## 2023-11-06 VITALS — BODY MASS INDEX: 30.8 KG/M2 | WEIGHT: 163 LBS

## 2023-11-06 VITALS — DIASTOLIC BLOOD PRESSURE: 65 MMHG | HEART RATE: 68 BPM | SYSTOLIC BLOOD PRESSURE: 105 MMHG

## 2023-11-06 DIAGNOSIS — R68.89 OTHER GENERAL SYMPTOMS AND SIGNS: ICD-10-CM

## 2023-11-06 DIAGNOSIS — Z79.899 OTHER LONG TERM (CURRENT) DRUG THERAPY: ICD-10-CM

## 2023-11-06 DIAGNOSIS — R79.9 ABNORMAL FINDING OF BLOOD CHEMISTRY, UNSPECIFIED: ICD-10-CM

## 2023-11-06 DIAGNOSIS — D64.9 ANEMIA, UNSPECIFIED: ICD-10-CM

## 2023-11-06 PROCEDURE — 99214 OFFICE O/P EST MOD 30 MIN: CPT | Mod: 25

## 2023-11-06 PROCEDURE — 36415 COLL VENOUS BLD VENIPUNCTURE: CPT

## 2023-11-12 LAB
ALBUMIN SERPL ELPH-MCNC: 4 G/DL
ALP BLD-CCNC: 56 U/L
ALT SERPL-CCNC: 13 U/L
ANION GAP SERPL CALC-SCNC: 13 MMOL/L
APPEARANCE: ABNORMAL
AST SERPL-CCNC: 18 U/L
BASOPHILS # BLD AUTO: 0.03 K/UL
BASOPHILS NFR BLD AUTO: 0.5 %
BILIRUB SERPL-MCNC: 0.2 MG/DL
BILIRUBIN URINE: NEGATIVE
BLOOD URINE: ABNORMAL
BUN SERPL-MCNC: 9 MG/DL
CALCIUM SERPL-MCNC: 9.2 MG/DL
CHLORIDE SERPL-SCNC: 105 MMOL/L
CHOLEST SERPL-MCNC: 182 MG/DL
CO2 SERPL-SCNC: 22 MMOL/L
COLOR: YELLOW
CREAT SERPL-MCNC: 0.6 MG/DL
CREAT SPEC-SCNC: 81 MG/DL
EGFR: 113 ML/MIN/1.73M2
EOSINOPHIL # BLD AUTO: 0.1 K/UL
EOSINOPHIL NFR BLD AUTO: 1.8 %
ESTIMATED AVERAGE GLUCOSE: 111 MG/DL
FERRITIN SERPL-MCNC: 15 NG/ML
GLUCOSE QUALITATIVE U: NEGATIVE MG/DL
GLUCOSE SERPL-MCNC: 82 MG/DL
HBA1C MFR BLD HPLC: 5.5 %
HCT VFR BLD CALC: 39.9 %
HDLC SERPL-MCNC: 51 MG/DL
HGB BLD-MCNC: 12.6 G/DL
IMM GRANULOCYTES NFR BLD AUTO: 0.2 %
KETONES URINE: NEGATIVE MG/DL
LDLC SERPL CALC-MCNC: 109 MG/DL
LEUKOCYTE ESTERASE URINE: NEGATIVE
LYMPHOCYTES # BLD AUTO: 1.26 K/UL
LYMPHOCYTES NFR BLD AUTO: 22.8 %
MAGNESIUM SERPL-MCNC: 2 MG/DL
MAN DIFF?: NORMAL
MCHC RBC-ENTMCNC: 31 PG
MCHC RBC-ENTMCNC: 31.6 GM/DL
MCV RBC AUTO: 98.3 FL
MICROALBUMIN 24H UR DL<=1MG/L-MCNC: 6.3 MG/DL
MICROALBUMIN/CREAT 24H UR-RTO: 77 MG/G
MONOCYTES # BLD AUTO: 0.33 K/UL
MONOCYTES NFR BLD AUTO: 6 %
NEUTROPHILS # BLD AUTO: 3.8 K/UL
NEUTROPHILS NFR BLD AUTO: 68.7 %
NITRITE URINE: NEGATIVE
NONHDLC SERPL-MCNC: 131 MG/DL
PH URINE: 6
PLATELET # BLD AUTO: 357 K/UL
POTASSIUM SERPL-SCNC: 4.7 MMOL/L
PROT SERPL-MCNC: 6.6 G/DL
PROTEIN URINE: NORMAL MG/DL
RBC # BLD: 4.06 M/UL
RBC # FLD: 13 %
SODIUM SERPL-SCNC: 140 MMOL/L
SPECIFIC GRAVITY URINE: 1.01
TRIGL SERPL-MCNC: 122 MG/DL
TSH SERPL-ACNC: 1.24 UIU/ML
UROBILINOGEN URINE: 0.2 MG/DL
VIT B12 SERPL-MCNC: 333 PG/ML
WBC # FLD AUTO: 5.53 K/UL

## 2023-11-30 ENCOUNTER — APPOINTMENT (OUTPATIENT)
Dept: NEPHROLOGY | Facility: CLINIC | Age: 43
End: 2023-11-30
Payer: MEDICAID

## 2023-11-30 PROCEDURE — 99214 OFFICE O/P EST MOD 30 MIN: CPT | Mod: 95

## 2023-12-07 ENCOUNTER — NON-APPOINTMENT (OUTPATIENT)
Age: 43
End: 2023-12-07

## 2023-12-19 ENCOUNTER — APPOINTMENT (OUTPATIENT)
Dept: UROLOGY | Facility: CLINIC | Age: 43
End: 2023-12-19
Payer: MEDICAID

## 2023-12-19 LAB
BILIRUB UR QL STRIP: NORMAL
CLARITY UR: CLEAR
COLLECTION METHOD: NORMAL
GLUCOSE UR-MCNC: NORMAL
HCG UR QL: 0.2 EU/DL
HGB UR QL STRIP.AUTO: ABNORMAL
KETONES UR-MCNC: NORMAL
LEUKOCYTE ESTERASE UR QL STRIP: NORMAL
NITRITE UR QL STRIP: NORMAL
PH UR STRIP: 6
PROT UR STRIP-MCNC: 100
SP GR UR STRIP: 1.03

## 2023-12-19 PROCEDURE — 81003 URINALYSIS AUTO W/O SCOPE: CPT | Mod: QW

## 2023-12-19 PROCEDURE — 99203 OFFICE O/P NEW LOW 30 MIN: CPT | Mod: 25

## 2023-12-19 NOTE — LETTER BODY
[Dear  ___] : Dear  [unfilled], [Courtesy Letter:] : I had the pleasure of seeing your patient, [unfilled], in my office today. [Please see my note below.] : Please see my note below. [Consult Closing:] : Thank you very much for allowing me to participate in the care of this patient.  If you have any questions, please do not hesitate to contact me. [Sincerely,] : Sincerely, [FreeTextEntry3] : Jared Amos MD

## 2023-12-19 NOTE — HISTORY OF PRESENT ILLNESS
[FreeTextEntry1] : 43 yr old female presents with microscopic hematuria. Long standing, never had workup. States hx of frequent UTIs in the past, recently not as many but had 2 instances close together over the summer with symptoms, no UCx. No gross hematuria. Family hx of prostate cancer (grandfather, uncles-- 1 s/p radical prostatectomy), denies FH/ personal hx of kidney stones, former smoker for 2 years-- maybe 1 pack/week. Social alcohol. No exposure to chemicals.   Has fibroids, was told 1 is on the outside of the uterus. Recently more urgency, no incontinence. Sometimes feels bladder pressure.   UA: 11/26/23 12 RBCs/HPF 11/2022: 5 RBCs/HPF  UA dip today: large blood

## 2023-12-19 NOTE — ASSESSMENT
[FreeTextEntry1] : 43-year-old female presents with microhematuria.   We educated patient on reasons for blood in the urine which include idiopathic, acute UTI, kidney stone, kidney lesion, or bladder lesion. The patient understands that the entire urinary tract from the kidneys to the urethra must be explored. A renal bladder ultrasound and cystoscopy will be done at next appointment. The procedure was explained in detail to the patient. Urine was sent for UA, UCx.  - RTC for RBUS and Cystoscopy  - RBUS - Cysto

## 2023-12-20 LAB
APPEARANCE: CLEAR
BACTERIA: ABNORMAL /HPF
BILIRUBIN URINE: NEGATIVE
BLOOD URINE: ABNORMAL
CAST: NORMAL /LPF
COLOR: NORMAL
EPITHELIAL CELLS: 36 /HPF
GLUCOSE QUALITATIVE U: NEGATIVE MG/DL
KETONES URINE: ABNORMAL MG/DL
LEUKOCYTE ESTERASE URINE: ABNORMAL
MICROSCOPIC-UA: NORMAL
MUCUS: PRESENT
NITRITE URINE: NEGATIVE
PH URINE: 5.5
PROTEIN URINE: 100 MG/DL
RED BLOOD CELLS URINE: NORMAL /HPF
REVIEW: NORMAL
SPECIFIC GRAVITY URINE: >1.03
UROBILINOGEN URINE: 1 MG/DL
WHITE BLOOD CELLS URINE: 16 /HPF

## 2023-12-21 ENCOUNTER — APPOINTMENT (OUTPATIENT)
Dept: INTERNAL MEDICINE | Facility: CLINIC | Age: 43
End: 2023-12-21
Payer: MEDICAID

## 2023-12-21 VITALS
OXYGEN SATURATION: 99 % | SYSTOLIC BLOOD PRESSURE: 122 MMHG | WEIGHT: 155 LBS | DIASTOLIC BLOOD PRESSURE: 75 MMHG | TEMPERATURE: 97.9 F | BODY MASS INDEX: 29.27 KG/M2 | HEART RATE: 77 BPM | HEIGHT: 61 IN

## 2023-12-21 DIAGNOSIS — Z83.3 FAMILY HISTORY OF DIABETES MELLITUS: ICD-10-CM

## 2023-12-21 DIAGNOSIS — E03.9 HYPOTHYROIDISM, UNSPECIFIED: ICD-10-CM

## 2023-12-21 DIAGNOSIS — R92.30 DENSE BREASTS, UNSPECIFIED: ICD-10-CM

## 2023-12-21 DIAGNOSIS — Z82.49 FAMILY HISTORY OF ISCHEMIC HEART DISEASE AND OTHER DISEASES OF THE CIRCULATORY SYSTEM: ICD-10-CM

## 2023-12-21 DIAGNOSIS — Z00.00 ENCOUNTER FOR GENERAL ADULT MEDICAL EXAMINATION W/OUT ABNORMAL FINDINGS: ICD-10-CM

## 2023-12-21 DIAGNOSIS — E61.1 IRON DEFICIENCY: ICD-10-CM

## 2023-12-21 DIAGNOSIS — R53.83 OTHER FATIGUE: ICD-10-CM

## 2023-12-21 DIAGNOSIS — Z82.0 FAMILY HISTORY OF EPILEPSY AND OTHER DISEASES OF THE NERVOUS SYSTEM: ICD-10-CM

## 2023-12-21 DIAGNOSIS — Z87.891 PERSONAL HISTORY OF NICOTINE DEPENDENCE: ICD-10-CM

## 2023-12-21 LAB — BACTERIA UR CULT: NORMAL

## 2023-12-21 PROCEDURE — 99386 PREV VISIT NEW AGE 40-64: CPT

## 2023-12-21 PROCEDURE — 99396 PREV VISIT EST AGE 40-64: CPT

## 2023-12-21 RX ORDER — LEVOTHYROXINE SODIUM 0.05 MG/1
50 TABLET ORAL
Qty: 90 | Refills: 3 | Status: ACTIVE | COMMUNITY
Start: 2022-05-06 | End: 1900-01-01

## 2023-12-21 NOTE — PHYSICAL EXAM
[No Acute Distress] : no acute distress [Well-Appearing] : well-appearing [Normal Sclera/Conjunctiva] : normal sclera/conjunctiva [No Respiratory Distress] : no respiratory distress  [Clear to Auscultation] : lungs were clear to auscultation bilaterally [Normal Rate] : normal rate  [Regular Rhythm] : with a regular rhythm [Normal S1, S2] : normal S1 and S2 [No Murmur] : no murmur heard [No Edema] : there was no peripheral edema [Soft] : abdomen soft [Normal Supraclavicular Nodes] : no supraclavicular lymphadenopathy [Normal Anterior Cervical Nodes] : no anterior cervical lymphadenopathy [No Rash] : no rash [Non Tender] : non-tender [No Masses] : no abdominal mass palpated

## 2023-12-21 NOTE — HISTORY OF PRESENT ILLNESS
[de-identified] : 44 y/o female here to initiate care   Had an interuption in care with insurance Previously seen by Marleni for primary care, he is now only doing renal Hx of anemia, PURVI, has needed iron infusions in the past - 8 sessions in 2019 had difficulty with iron po absorption and side effects This year had three infusions, but then developed swelling at the infusion site. Hematologist just changed - Dr. Redding, had visit and labs just recently. Periods were 7-10 days, heavy, needed to change q2-3 hours  then started on OCPS a few years ago, on Junel which helped The past few months has had a significant increase in flow, thougth 2/5 days with very heavy spotting also spotting in between can't leave house on her heavy days, difficulty getting off of the couch  Recently has had several weeks of nasal congestion,  intermittent sore throat, negative covid, flu, strep  Last GYN a year ago - looking for a new one  Hx hypothyroidism x 16 years, dose has been stable hx of heart murmur

## 2023-12-21 NOTE — ASSESSMENT
[FreeTextEntry1] : 44 y/o female to establish care  Fatigue: likely due to PURVI based on hx, timing related to menses -GYN, Heme and GI f/u for further managment  Hypothryoidism: -recent TSH normal -levothyroxine refilled  PURVI:  seeing heme needs GYN needs GI for PURVI + colon cancer screening   HCM: GYN: PAP - to see GYN Mammo - ordered with sono (hx of dense breasts) CRC screening - with GI as noted Metabolic Screening done, ASCVD risk score 0.71%, with + famil hx Flu shot declines Advised COVID 23-24 vaccine declines

## 2024-01-08 ENCOUNTER — NON-APPOINTMENT (OUTPATIENT)
Age: 44
End: 2024-01-08

## 2024-01-10 ENCOUNTER — NON-APPOINTMENT (OUTPATIENT)
Age: 44
End: 2024-01-10

## 2024-01-16 ENCOUNTER — APPOINTMENT (OUTPATIENT)
Dept: UROLOGY | Facility: CLINIC | Age: 44
End: 2024-01-16

## 2024-01-18 ENCOUNTER — APPOINTMENT (OUTPATIENT)
Dept: OBGYN | Facility: CLINIC | Age: 44
End: 2024-01-18
Payer: MEDICAID

## 2024-01-29 ENCOUNTER — NON-APPOINTMENT (OUTPATIENT)
Age: 44
End: 2024-01-29

## 2024-02-01 ENCOUNTER — OUTPATIENT (OUTPATIENT)
Dept: OUTPATIENT SERVICES | Facility: HOSPITAL | Age: 44
LOS: 1 days | End: 2024-02-01
Payer: MEDICAID

## 2024-02-01 ENCOUNTER — APPOINTMENT (OUTPATIENT)
Dept: OBGYN | Facility: CLINIC | Age: 44
End: 2024-02-01
Payer: MEDICAID

## 2024-02-01 VITALS
WEIGHT: 162 LBS | BODY MASS INDEX: 30.58 KG/M2 | HEIGHT: 61 IN | HEART RATE: 87 BPM | RESPIRATION RATE: 16 BRPM | SYSTOLIC BLOOD PRESSURE: 111 MMHG | OXYGEN SATURATION: 97 % | DIASTOLIC BLOOD PRESSURE: 67 MMHG | TEMPERATURE: 98.5 F

## 2024-02-01 DIAGNOSIS — N92.1 EXCESSIVE AND FREQUENT MENSTRUATION WITH IRREGULAR CYCLE: ICD-10-CM

## 2024-02-01 DIAGNOSIS — D25.1 INTRAMURAL LEIOMYOMA OF UTERUS: ICD-10-CM

## 2024-02-01 DIAGNOSIS — D25.0 INTRAMURAL LEIOMYOMA OF UTERUS: ICD-10-CM

## 2024-02-01 DIAGNOSIS — D21.9 BENIGN NEOPLASM OF CONNECTIVE AND OTHER SOFT TISSUE, UNSPECIFIED: ICD-10-CM

## 2024-02-01 DIAGNOSIS — Z00.00 ENCOUNTER FOR GENERAL ADULT MEDICAL EXAMINATION WITHOUT ABNORMAL FINDINGS: ICD-10-CM

## 2024-02-01 PROCEDURE — 99204 OFFICE O/P NEW MOD 45 MIN: CPT

## 2024-02-01 PROCEDURE — G0463: CPT

## 2024-02-01 RX ORDER — LEVOTHYROXINE SODIUM 0.09 MG/1
88 TABLET ORAL
Refills: 0 | Status: ACTIVE | COMMUNITY

## 2024-02-01 NOTE — HISTORY OF PRESENT ILLNESS
[Patient reported PAP Smear was normal] : Patient reported PAP Smear was normal [Currently Active] : currently active [FreeTextEntry1] : CC: Fibroids, bleeding, second opinion HPI: PT has her own GYN MD, recently had a full exam and pap smear. PT reports a h/o fibroids and menorrhagia, was taking Junel x 5yrs with good control, but menorrhagia recurred, Had Heavy bleeding from 12/30-1/16, then bleeding again last wk. PT was recommended to have hysterectomy. PT was also precribed Myfembree if she prefers to wait for now, pt has not started medication. PT is here for 2nd opinion regarding her options of treatment  Pelvic sono 1/25/24 Ut 10.3x6.3x.6.8cm with 3.4cm fibroid, 2.2cm fibroid  and 1.6cm fibroid  Em 1cm, Ov nl  [PapSmeardate] : 01/23/24

## 2024-02-01 NOTE — PLAN
[FreeTextEntry1] : Various options of management of fibroids and menorrhagia dw/ pt, including but not limited to hormonal contraceptives, Myfembree, progesterone IUD, UAE, hysterectomy dw/pt. PT is not interested in surgery at this time. hormonal contraceptive options ie another higher dose ocp, depoprovera, Nexplanon, and Myfembree dw/ pt. Risks/side effects of these medications, particularly Myfembree dw/pt PT will take Myfembree and f/u w/ her own gyn. Recommend endometrial biopsy.

## 2024-02-02 DIAGNOSIS — D21.9 BENIGN NEOPLASM OF CONNECTIVE AND OTHER SOFT TISSUE, UNSPECIFIED: ICD-10-CM

## 2024-02-02 DIAGNOSIS — D25.1 INTRAMURAL LEIOMYOMA OF UTERUS: ICD-10-CM

## 2024-02-02 DIAGNOSIS — N92.1 EXCESSIVE AND FREQUENT MENSTRUATION WITH IRREGULAR CYCLE: ICD-10-CM

## 2024-02-09 ENCOUNTER — NON-APPOINTMENT (OUTPATIENT)
Age: 44
End: 2024-02-09

## 2024-02-27 ENCOUNTER — APPOINTMENT (OUTPATIENT)
Dept: UROLOGY | Facility: CLINIC | Age: 44
End: 2024-02-27
Payer: MEDICAID

## 2024-02-27 DIAGNOSIS — R31.29 OTHER MICROSCOPIC HEMATURIA: ICD-10-CM

## 2024-02-27 PROCEDURE — 52000 CYSTOURETHROSCOPY: CPT

## 2024-02-27 PROCEDURE — 76775 US EXAM ABDO BACK WALL LIM: CPT

## 2024-04-30 DIAGNOSIS — N60.09 SOLITARY CYST OF UNSPECIFIED BREAST: ICD-10-CM

## 2024-04-30 RX ORDER — NITROFURANTOIN (MONOHYDRATE/MACROCRYSTALS) 25; 75 MG/1; MG/1
100 CAPSULE ORAL
Qty: 10 | Refills: 0 | Status: DISCONTINUED | COMMUNITY
Start: 2023-05-24 | End: 2024-04-30

## 2024-05-09 ENCOUNTER — TRANSCRIPTION ENCOUNTER (OUTPATIENT)
Age: 44
End: 2024-05-09

## 2024-06-11 ENCOUNTER — LABORATORY RESULT (OUTPATIENT)
Age: 44
End: 2024-06-11

## 2024-06-11 ENCOUNTER — APPOINTMENT (OUTPATIENT)
Dept: NEPHROLOGY | Facility: CLINIC | Age: 44
End: 2024-06-11
Payer: MEDICAID

## 2024-06-11 VITALS — HEART RATE: 75 BPM | DIASTOLIC BLOOD PRESSURE: 64 MMHG | SYSTOLIC BLOOD PRESSURE: 111 MMHG

## 2024-06-11 VITALS — BODY MASS INDEX: 30.04 KG/M2 | WEIGHT: 159 LBS

## 2024-06-11 DIAGNOSIS — M10.9 GOUT, UNSPECIFIED: ICD-10-CM

## 2024-06-11 DIAGNOSIS — R80.9 PROTEINURIA, UNSPECIFIED: ICD-10-CM

## 2024-06-11 DIAGNOSIS — R31.9 HEMATURIA, UNSPECIFIED: ICD-10-CM

## 2024-06-11 DIAGNOSIS — R30.0 DYSURIA: ICD-10-CM

## 2024-06-11 PROCEDURE — 99214 OFFICE O/P EST MOD 30 MIN: CPT

## 2024-06-11 PROCEDURE — G2211 COMPLEX E/M VISIT ADD ON: CPT | Mod: NC

## 2024-06-11 RX ORDER — NITROFURANTOIN (MONOHYDRATE/MACROCRYSTALS) 25; 75 MG/1; MG/1
100 CAPSULE ORAL
Qty: 10 | Refills: 0 | Status: ACTIVE | COMMUNITY
Start: 2024-06-11 | End: 1900-01-01

## 2024-06-11 NOTE — ASSESSMENT
[FreeTextEntry1] : # Microscopic hematuria/albuminuria for > 4 years. * Negative urologic eval. Likeliest explanation is collagenopathy (Alport syndrome) or low level IgA nephropathy. * Renasight panel (385 genes) ordered. Written informed consent obtained. * Risk of renal biopsy outweighs benefits. * Absolute renal benefits of SGLT2i are uncertain.   # Probable gout. * Advised to report any new episodes and avoid shellfish.  # Probable UTI. * Macrobid x 5 days. * Counseled on immediately reporting worsening or sx of pyelo. * Check urine culture.

## 2024-06-11 NOTE — HISTORY OF PRESENT ILLNESS
Capsule data recording device removed. Patient denies any complaints at this time in regards to capsule procedure. [FreeTextEntry1] : Previous PCP: Dr. Bhandari. Aye.   * Evaluated by Dr. Amos for hematuria. Cysto reportedly negative. * Following up with gyn for thyroid. * No further great toe discomfort (? gout). No recurrence. * 77 mg albuminuria last visit. * Intermittent has cloudy urine with discomfort and smell for 1 - 2 weeks, most recently. No back pain or chills or fever.   Previous history (30Nov23): * She has been previously told of microscopic hematuria 3.5 years ago. 12 RBCs/hpf last U/A. She has been advised to see urologist. Also noted to have 77 mg albuminuria. (Uncertain whether this is related to hematuria.) FH: no kidney disease. GN workup was negative.   Previous history (06Nov23): *  Following up with Dr. Seay for breathing issues. * Borderline anemia (HGB 11.3) with iron deficient anemia for which she has followed up with heme. * Borderline albuminuria. * Slight fatigue.  * No thaving her period today. * ADVISED THAT SHE MUST FOLLOW UP WITH GI FOR COLONOSCOPY. * Woke up with left great toe discomfort 5 days ago. Red and swollen. Went to urgent care. Now resolved. * Occasional urinary urgency. No dysuria. No accidents.   *Previous history (19Jan23):  Following up with Dr. Seay for breathing issues. * Hematuria previously but she was having her period (likely false positive). US normal. * Borderline anemia (HGB 11.3) with iron deficient anemia for which she has followed up with heme.   Previous history (27Jul22): * Following up with Dr. SEAY for SOB with stair climbing. Sleep apnea evaluation. * She is also following up with cardiologist. * HGB 11.4. Following up with hematologist. * She had her period last visit with hematuria. She believes she may have had hematuria and/or her period previously. She has her period today. 70 mg albuminuria.   Previous history (06May22): For 6 months she has noticed SOB with climbing steps at work (three flights). No cough or CP.  This also happened two years ago and she was found to be iron deficient and severely anemic due to heavy periods. She follows with her hematologist every 6 months. She was treated empirically for asthma with albuterol  by previous PCP and she doesn't believe this helped. * Snoring.   No heavy periods. On birth control. No smoking. No early CAD.   She sees a cardiologist for a "slight heart murmur". She had an annual visit 5 months ago and she feels she told them of her shortnesss of breath. Stress test scheduled.

## 2024-06-16 LAB
APPEARANCE: CLEAR
BACTERIA UR CULT: ABNORMAL
BILIRUBIN URINE: NEGATIVE
BLOOD URINE: ABNORMAL
COLOR: YELLOW
CREAT SPEC-SCNC: 123 MG/DL
GLUCOSE QUALITATIVE U: NEGATIVE MG/DL
KETONES URINE: NEGATIVE MG/DL
LEUKOCYTE ESTERASE URINE: ABNORMAL
MICROALBUMIN 24H UR DL<=1MG/L-MCNC: 10.8 MG/DL
MICROALBUMIN/CREAT 24H UR-RTO: 87 MG/G
NITRITE URINE: NEGATIVE
PH URINE: 6
PROTEIN URINE: 30 MG/DL
SPECIFIC GRAVITY URINE: 1.01
UROBILINOGEN URINE: 0.2 MG/DL

## 2024-07-12 ENCOUNTER — NON-APPOINTMENT (OUTPATIENT)
Age: 44
End: 2024-07-12

## 2024-08-26 ENCOUNTER — APPOINTMENT (OUTPATIENT)
Dept: UROLOGY | Facility: CLINIC | Age: 44
End: 2024-08-26
Payer: MEDICAID

## 2024-08-26 VITALS
WEIGHT: 159 LBS | SYSTOLIC BLOOD PRESSURE: 141 MMHG | HEART RATE: 90 BPM | BODY MASS INDEX: 30.02 KG/M2 | DIASTOLIC BLOOD PRESSURE: 81 MMHG | TEMPERATURE: 98.2 F | HEIGHT: 61 IN

## 2024-08-26 DIAGNOSIS — R39.9 UNSPECIFIED SYMPTOMS AND SIGNS INVOLVING THE GENITOURINARY SYSTEM: ICD-10-CM

## 2024-08-26 DIAGNOSIS — R31.29 OTHER MICROSCOPIC HEMATURIA: ICD-10-CM

## 2024-08-26 PROCEDURE — 99214 OFFICE O/P EST MOD 30 MIN: CPT

## 2024-08-26 PROCEDURE — G2211 COMPLEX E/M VISIT ADD ON: CPT | Mod: NC

## 2024-08-26 NOTE — HISTORY OF PRESENT ILLNESS
[FreeTextEntry1] : 43 yr old female presents with microscopic hematuria. Long standing, never had workup. States hx of frequent UTIs in the past, recently not as many but had 2 instances close together over the summer with symptoms, no UCx. No gross hematuria. Family hx of prostate cancer (grandfather, uncles-- 1 s/p radical prostatectomy), denies FH/ personal hx of kidney stones, former smoker for 2 years-- maybe 1 pack/week. Social alcohol. No exposure to chemicals.   Has fibroids, was told 1 is on the outside of the uterus. Recently more urgency, no incontinence. Sometimes feels bladder pressure.   UA: 11/26/23 12 RBCs/HPF 11/2022: 5 RBCs/HPF  UA dip today: large blood    8/26/24: Recurrent UTI symptoms, negative urine cultures. Moderate improvement with macrobid. Minimal concern for STDs. Symptoms characterized by cloudy urine, change in odor, more frequency. No dysuria. Symptoms more frequent since cystoscopy.

## 2024-08-26 NOTE — ASSESSMENT
[FreeTextEntry1] : 44 year old woman, history of microhematuria, negative renal ultrasound and cystoscopy February 2024. Repeat evaluation every 3-5 years for persistent microhematuria.  Recurrent UTI symptoms, more recently. Negative urine cultures. Differential includes infectious, inflammatory, musculoskeletal, OAB.   - UA, UCx, GC/CT, ureaplasma, mycoplasma, trichomonas

## 2024-08-27 ENCOUNTER — NON-APPOINTMENT (OUTPATIENT)
Age: 44
End: 2024-08-27

## 2024-08-27 RX ORDER — SULFAMETHOXAZOLE AND TRIMETHOPRIM 800; 160 MG/1; MG/1
800-160 TABLET ORAL TWICE DAILY
Qty: 14 | Refills: 0 | Status: ACTIVE | COMMUNITY
Start: 2024-08-27 | End: 1900-01-01

## 2024-08-30 LAB
APPEARANCE: ABNORMAL
BACTERIA UR CULT: ABNORMAL
BACTERIA: ABNORMAL /HPF
BILIRUBIN URINE: NEGATIVE
BLOOD URINE: ABNORMAL
C TRACH RRNA SPEC QL NAA+PROBE: NOT DETECTED
CAST: 2 /LPF
COLOR: YELLOW
EPITHELIAL CELLS: 26 /HPF
GLUCOSE QUALITATIVE U: NEGATIVE MG/DL
KETONES URINE: NEGATIVE MG/DL
LEUKOCYTE ESTERASE URINE: ABNORMAL
M GENITALIUM DNA UR QL NAA+PROBE: NEGATIVE
M HOMINIS DNA SPEC QL NAA+PROBE: NEGATIVE
MICROSCOPIC-UA: NORMAL
N GONORRHOEA RRNA SPEC QL NAA+PROBE: NOT DETECTED
NITRITE URINE: NEGATIVE
PH URINE: 5.5
PROTEIN URINE: 100 MG/DL
RED BLOOD CELLS URINE: 39 /HPF
RENAL TUBULAR EPITHELIAL CELLS: PRESENT
REVIEW: NORMAL
SOURCE AMPLIFICATION: NORMAL
SOURCE AMPLIFICATION: NORMAL
SPECIFIC GRAVITY URINE: 1.03
T VAGINALIS RRNA SPEC QL NAA+PROBE: NOT DETECTED
UREAPLASMA DNA SPEC QL NAA+PROBE: POSITIVE
UROBILINOGEN URINE: 0.2 MG/DL
WBC CLUMPS: PRESENT
WHITE BLOOD CELLS URINE: 22 /HPF
YEAST-LIKE CELLS: PRESENT

## 2024-09-03 ENCOUNTER — NON-APPOINTMENT (OUTPATIENT)
Age: 44
End: 2024-09-03

## 2024-09-23 ENCOUNTER — NON-APPOINTMENT (OUTPATIENT)
Age: 44
End: 2024-09-23

## 2024-10-24 ENCOUNTER — TRANSCRIPTION ENCOUNTER (OUTPATIENT)
Age: 44
End: 2024-10-24

## 2024-10-27 ENCOUNTER — EMERGENCY (EMERGENCY)
Facility: HOSPITAL | Age: 44
LOS: 1 days | Discharge: HOME | End: 2024-10-27
Attending: STUDENT IN AN ORGANIZED HEALTH CARE EDUCATION/TRAINING PROGRAM | Admitting: STUDENT IN AN ORGANIZED HEALTH CARE EDUCATION/TRAINING PROGRAM
Payer: COMMERCIAL

## 2024-10-27 VITALS
WEIGHT: 158.07 LBS | TEMPERATURE: 98 F | HEART RATE: 91 BPM | SYSTOLIC BLOOD PRESSURE: 144 MMHG | HEIGHT: 61 IN | DIASTOLIC BLOOD PRESSURE: 80 MMHG | OXYGEN SATURATION: 98 % | RESPIRATION RATE: 16 BRPM

## 2024-10-27 LAB
ALBUMIN SERPL ELPH-MCNC: 3.9 G/DL — SIGNIFICANT CHANGE UP (ref 3.3–5)
ALP SERPL-CCNC: 65 U/L — SIGNIFICANT CHANGE UP (ref 40–120)
ALT FLD-CCNC: 11 U/L — SIGNIFICANT CHANGE UP (ref 4–33)
ANION GAP SERPL CALC-SCNC: 9 MMOL/L — SIGNIFICANT CHANGE UP (ref 7–14)
APPEARANCE UR: ABNORMAL
APTT BLD: 30.6 SEC — SIGNIFICANT CHANGE UP (ref 24.5–35.6)
AST SERPL-CCNC: 15 U/L — SIGNIFICANT CHANGE UP (ref 4–32)
BACTERIA # UR AUTO: NEGATIVE /HPF — SIGNIFICANT CHANGE UP
BASOPHILS # BLD AUTO: 0.02 K/UL — SIGNIFICANT CHANGE UP (ref 0–0.2)
BASOPHILS NFR BLD AUTO: 0.3 % — SIGNIFICANT CHANGE UP (ref 0–2)
BILIRUB SERPL-MCNC: <0.2 MG/DL — SIGNIFICANT CHANGE UP (ref 0.2–1.2)
BILIRUB UR-MCNC: NEGATIVE — SIGNIFICANT CHANGE UP
BLD GP AB SCN SERPL QL: NEGATIVE — SIGNIFICANT CHANGE UP
BUN SERPL-MCNC: 7 MG/DL — SIGNIFICANT CHANGE UP (ref 7–23)
CALCIUM SERPL-MCNC: 8.6 MG/DL — SIGNIFICANT CHANGE UP (ref 8.4–10.5)
CAST: 0 /LPF — SIGNIFICANT CHANGE UP (ref 0–4)
CHLORIDE SERPL-SCNC: 107 MMOL/L — SIGNIFICANT CHANGE UP (ref 98–107)
CO2 SERPL-SCNC: 23 MMOL/L — SIGNIFICANT CHANGE UP (ref 22–31)
COLOR SPEC: ABNORMAL
CREAT SERPL-MCNC: 0.62 MG/DL — SIGNIFICANT CHANGE UP (ref 0.5–1.3)
DIFF PNL FLD: ABNORMAL
EGFR: 111 ML/MIN/1.73M2 — SIGNIFICANT CHANGE UP
EOSINOPHIL # BLD AUTO: 0.07 K/UL — SIGNIFICANT CHANGE UP (ref 0–0.5)
EOSINOPHIL NFR BLD AUTO: 1.2 % — SIGNIFICANT CHANGE UP (ref 0–6)
GLUCOSE SERPL-MCNC: 97 MG/DL — SIGNIFICANT CHANGE UP (ref 70–99)
GLUCOSE UR QL: 250 MG/DL
HCG SERPL-ACNC: <1 MIU/ML — SIGNIFICANT CHANGE UP
HCT VFR BLD CALC: 38.1 % — SIGNIFICANT CHANGE UP (ref 34.5–45)
HGB BLD-MCNC: 12.9 G/DL — SIGNIFICANT CHANGE UP (ref 11.5–15.5)
IANC: 4.26 K/UL — SIGNIFICANT CHANGE UP (ref 1.8–7.4)
IMM GRANULOCYTES NFR BLD AUTO: 0.2 % — SIGNIFICANT CHANGE UP (ref 0–0.9)
INR BLD: 0.9 RATIO — SIGNIFICANT CHANGE UP (ref 0.85–1.16)
KETONES UR-MCNC: NEGATIVE MG/DL — SIGNIFICANT CHANGE UP
LEUKOCYTE ESTERASE UR-ACNC: ABNORMAL
LYMPHOCYTES # BLD AUTO: 1.07 K/UL — SIGNIFICANT CHANGE UP (ref 1–3.3)
LYMPHOCYTES # BLD AUTO: 17.8 % — SIGNIFICANT CHANGE UP (ref 13–44)
MCHC RBC-ENTMCNC: 31.2 PG — SIGNIFICANT CHANGE UP (ref 27–34)
MCHC RBC-ENTMCNC: 33.9 GM/DL — SIGNIFICANT CHANGE UP (ref 32–36)
MCV RBC AUTO: 92.3 FL — SIGNIFICANT CHANGE UP (ref 80–100)
MONOCYTES # BLD AUTO: 0.59 K/UL — SIGNIFICANT CHANGE UP (ref 0–0.9)
MONOCYTES NFR BLD AUTO: 9.8 % — SIGNIFICANT CHANGE UP (ref 2–14)
NEUTROPHILS # BLD AUTO: 4.26 K/UL — SIGNIFICANT CHANGE UP (ref 1.8–7.4)
NEUTROPHILS NFR BLD AUTO: 70.7 % — SIGNIFICANT CHANGE UP (ref 43–77)
NITRITE UR-MCNC: NEGATIVE — SIGNIFICANT CHANGE UP
NRBC # BLD: 0 /100 WBCS — SIGNIFICANT CHANGE UP (ref 0–0)
NRBC # FLD: 0 K/UL — SIGNIFICANT CHANGE UP (ref 0–0)
PH UR: 8 — SIGNIFICANT CHANGE UP (ref 5–8)
PLATELET # BLD AUTO: 314 K/UL — SIGNIFICANT CHANGE UP (ref 150–400)
POTASSIUM SERPL-MCNC: 4 MMOL/L — SIGNIFICANT CHANGE UP (ref 3.5–5.3)
POTASSIUM SERPL-SCNC: 4 MMOL/L — SIGNIFICANT CHANGE UP (ref 3.5–5.3)
PROT SERPL-MCNC: 6.8 G/DL — SIGNIFICANT CHANGE UP (ref 6–8.3)
PROT UR-MCNC: 100 MG/DL
PROTHROM AB SERPL-ACNC: 10.4 SEC — SIGNIFICANT CHANGE UP (ref 9.9–13.4)
RBC # BLD: 4.13 M/UL — SIGNIFICANT CHANGE UP (ref 3.8–5.2)
RBC # FLD: 13.5 % — SIGNIFICANT CHANGE UP (ref 10.3–14.5)
RBC CASTS # UR COMP ASSIST: 1146 /HPF — HIGH (ref 0–4)
RH IG SCN BLD-IMP: POSITIVE — SIGNIFICANT CHANGE UP
SODIUM SERPL-SCNC: 139 MMOL/L — SIGNIFICANT CHANGE UP (ref 135–145)
SP GR SPEC: 1.03 — SIGNIFICANT CHANGE UP (ref 1–1.03)
SQUAMOUS # UR AUTO: 4 /HPF — SIGNIFICANT CHANGE UP (ref 0–5)
UROBILINOGEN FLD QL: 1 MG/DL — SIGNIFICANT CHANGE UP (ref 0.2–1)
WBC # BLD: 6.02 K/UL — SIGNIFICANT CHANGE UP (ref 3.8–10.5)
WBC # FLD AUTO: 6.02 K/UL — SIGNIFICANT CHANGE UP (ref 3.8–10.5)
WBC UR QL: 6 /HPF — HIGH (ref 0–5)

## 2024-10-27 PROCEDURE — 76830 TRANSVAGINAL US NON-OB: CPT | Mod: 26

## 2024-10-27 PROCEDURE — 93010 ELECTROCARDIOGRAM REPORT: CPT

## 2024-10-27 PROCEDURE — 99284 EMERGENCY DEPT VISIT MOD MDM: CPT

## 2024-10-27 NOTE — ED PROVIDER NOTE - NSDCPRINTRESULTS_ED_ALL_ED
Patient requests all Lab, Cardiology, and Radiology Results on their Discharge Instructions 0 = understands/communicates without difficulty

## 2024-10-27 NOTE — ED PROVIDER NOTE - CLINICAL SUMMARY MEDICAL DECISION MAKING FREE TEXT BOX
44yoF w/Hx of anemia on chronic iron infusions, hypothyroid on synthroid, AUB on Junel s/p hysteroscopy on 9/5 w/chronic vaginal spotting, p/w RLQ pain and heavy vaginal bleeding which began today, no associated syx.    pelvic: chaperoned by  EMMA Sheehan; +scant dark blood [place] vault os cloed adeline cmt no ovariabn ttp; +RLQ ttp on abd exdam      DDx: torsion vs fibroid bleed vs less likely appy, plan for labs, tvus, pt offered pain meds and declined, dispo pending w/u. - Randa Wild MD. EM Attending 44yoF w/Hx of anemia on chronic iron infusions, hypothyroid on synthroid, AUB on Junel s/p hysteroscopy on 9/5 w/chronic vaginal spotting, p/w RLQ pain and heavy vaginal bleeding which began today, no associated syx.    VS unremarkable  General: WN/WD NAD  Head: Atraumatic  Eyes: EOM grossly in tact, no scleral icterus  ENT: moist mucous membranes  Neurology: A&Ox3, nonfocal, AGUILAR x 4  Respiratory: normal respiratory effort ctab b/l  CV: Extremities warm and well perfused rrr no m/r/g  Abdominal: Soft, non-distended; +RLQ ttp no rebound/guarding no cva ttp  pelvic: chaperoned by  EMMA Sheehan; +scant dark blood in vault os closed no cmt no ovarian ttp  Extremities: No edema  Skin: No rashes    DDx: torsion vs fibroid bleed vs less likely appy, plan for labs, tvus, pt offered pain meds and declined, dispo pending w/u. - Randa Wild MD. EM Attending

## 2024-10-27 NOTE — ED ADULT NURSE NOTE - NSFALLUNIVINTERV_ED_ALL_ED
Bed/Stretcher in lowest position, wheels locked, appropriate side rails in place/Call bell, personal items and telephone in reach/Instruct patient to call for assistance before getting out of bed/chair/stretcher/Non-slip footwear applied when patient is off stretcher/Rosalie to call system/Physically safe environment - no spills, clutter or unnecessary equipment/Purposeful proactive rounding/Room/bathroom lighting operational, light cord in reach

## 2024-10-27 NOTE — ED PROVIDER NOTE - PATIENT PORTAL LINK FT
You can access the FollowMyHealth Patient Portal offered by [facility] by registering at the following website: [email or web address]. By joining [employer]s FollowMyHealth portal, you will also be able to view your health information using other applications (apps) compatible with our system.

## 2024-10-27 NOTE — ED PROVIDER NOTE - NSFOLLOWUPCLINICS_GEN_ALL_ED_FT
[facility]  OB/GYN & Surg  [phone] [place] [address]  Phone: [phone]  Fax:

## 2024-10-27 NOTE — ED PROVIDER NOTE - NSFOLLOWUPINSTRUCTIONS_ED_ALL_ED_FT
1) Follow up with your OBGYN in 48-72 hours for outpatient evaluation of cystic cervical lesions found on ultrasound.    2) Return to the ER immediately for new or worsening symptoms including uncontrolled pain or bleeding more than 1 pad per hour

## 2024-10-27 NOTE — ED ADULT TRIAGE NOTE - CHIEF COMPLAINT QUOTE
pt c/o vag bleed states I have been passing clots today but  I have  been passing tissue for few weeks with cramps   slightly light headed

## 2024-10-27 NOTE — ED ADULT NURSE NOTE - NSSUHOSCREENINGYN_ED_ALL_ED
To be completed in Nursing note:    Please reference list for forms that require a visit for completion.  Please remind patients that providers are given 3-5 business days to complete and return forms.      Form type: [facility]    Date form received: 24    Date form completed by Physician:24    How was form returned to patient (mailed, faxed, or at  for patient to ):  Faxed to   Date form mailed/faxed/left at  for patient and sent to HIM for scannin24, sent to HIM for scanning    Once form is left for patient, faxed, or mailed PCS will then close the documentation only encounter.     Jacob Hatfield MA    
Yes - the patient is able to be screened

## 2024-10-27 NOTE — ED PROVIDER NOTE - PROGRESS NOTE DETAILS
MD Rehman:  Pt signed out to me pending TVUS. TVUS significant  complex cervical cystic lesions which may represent complex nabothian cysts. Labs/UA non-actionable. UCx pending. Patient was re-evaluated at bedside, VSS, feeling better overall. Results were discussed with patient as well as return precautions and follow up plan with PCP and/or GYN. Time was taken to answer any questions that the patient had before providing them with discharge paperwork.

## 2024-10-27 NOTE — ED ADULT NURSE NOTE - OBJECTIVE STATEMENT
Pt received in intake 8. Pt alert and oriented x 4, ambulatory at baseline. Hx of hypothyroidism. Pt c/o vaginal bleeding with large clots that began today. Pt reports spotting for the past few weeks status post hysteroscopy. Pt flew in from [place] today. Pt endorses intermittent light handedness. Respirations even and unlabored, NAD. Abdomen soft, round, nondistended. Pt denies chest pain, shortness of breath, N/V/D, headache, dizziness, weakness, fever, chills. 20G IV in the right AC, labs drawn per MD orders.

## 2024-10-29 ENCOUNTER — NON-APPOINTMENT (OUTPATIENT)
Age: 44
End: 2024-10-29

## 2024-10-30 DIAGNOSIS — Z02.89 ENCOUNTER FOR OTHER ADMINISTRATIVE EXAMINATIONS: ICD-10-CM

## 2024-10-30 LAB
-  AMOXICILLIN/CLAVULANIC ACID: SIGNIFICANT CHANGE UP
-  AMPICILLIN/SULBACTAM: SIGNIFICANT CHANGE UP
-  AMPICILLIN: SIGNIFICANT CHANGE UP
-  AZTREONAM: SIGNIFICANT CHANGE UP
-  CEFAZOLIN: SIGNIFICANT CHANGE UP
-  CEFEPIME: SIGNIFICANT CHANGE UP
-  CEFOXITIN: SIGNIFICANT CHANGE UP
-  CEFTRIAXONE: SIGNIFICANT CHANGE UP
-  CEFUROXIME: SIGNIFICANT CHANGE UP
-  CIPROFLOXACIN: SIGNIFICANT CHANGE UP
-  ERTAPENEM: SIGNIFICANT CHANGE UP
-  GENTAMICIN: SIGNIFICANT CHANGE UP
-  IMIPENEM: SIGNIFICANT CHANGE UP
-  LEVOFLOXACIN: SIGNIFICANT CHANGE UP
-  MEROPENEM: SIGNIFICANT CHANGE UP
-  NITROFURANTOIN: SIGNIFICANT CHANGE UP
-  PIPERACILLIN/TAZOBACTAM: SIGNIFICANT CHANGE UP
-  TOBRAMYCIN: SIGNIFICANT CHANGE UP
-  TRIMETHOPRIM/SULFAMETHOXAZOLE: SIGNIFICANT CHANGE UP
CULTURE RESULTS: ABNORMAL
METHOD TYPE: SIGNIFICANT CHANGE UP
ORGANISM # SPEC MICROSCOPIC CNT: ABNORMAL
ORGANISM # SPEC MICROSCOPIC CNT: ABNORMAL
SPECIMEN SOURCE: SIGNIFICANT CHANGE UP

## 2024-10-30 RX ORDER — CEFPODOXIME PROXETIL 200 MG/1
200 TABLET, FILM COATED ORAL
Qty: 14 | Refills: 0 | Status: ACTIVE | COMMUNITY
Start: 2024-10-30 | End: 1900-01-01

## 2024-10-31 ENCOUNTER — TRANSCRIPTION ENCOUNTER (OUTPATIENT)
Age: 44
End: 2024-10-31

## 2024-11-04 ENCOUNTER — NON-APPOINTMENT (OUTPATIENT)
Age: 44
End: 2024-11-04

## 2024-11-04 LAB
M TB IFN-G BLD-IMP: NEGATIVE
QUANTIFERON TB PLUS MITOGEN MINUS NIL: 2.17 IU/ML
QUANTIFERON TB PLUS NIL: 0.02 IU/ML
QUANTIFERON TB PLUS TB1 MINUS NIL: 0 IU/ML
QUANTIFERON TB PLUS TB2 MINUS NIL: 0 IU/ML

## 2024-11-06 ENCOUNTER — NON-APPOINTMENT (OUTPATIENT)
Age: 44
End: 2024-11-06

## 2024-11-06 ENCOUNTER — APPOINTMENT (OUTPATIENT)
Dept: INTERNAL MEDICINE | Facility: CLINIC | Age: 44
End: 2024-11-06
Payer: COMMERCIAL

## 2024-11-06 VITALS
TEMPERATURE: 98.2 F | SYSTOLIC BLOOD PRESSURE: 130 MMHG | DIASTOLIC BLOOD PRESSURE: 80 MMHG | OXYGEN SATURATION: 98 % | HEIGHT: 61 IN | HEART RATE: 84 BPM | BODY MASS INDEX: 28.75 KG/M2 | WEIGHT: 152.25 LBS

## 2024-11-06 DIAGNOSIS — Z86.79 PERSONAL HISTORY OF OTHER DISEASES OF THE CIRCULATORY SYSTEM: ICD-10-CM

## 2024-11-06 DIAGNOSIS — Z87.898 PERSONAL HISTORY OF OTHER SPECIFIED CONDITIONS: ICD-10-CM

## 2024-11-06 DIAGNOSIS — Z87.448 PERSONAL HISTORY OF OTHER DISEASES OF URINARY SYSTEM: ICD-10-CM

## 2024-11-06 DIAGNOSIS — R53.83 OTHER FATIGUE: ICD-10-CM

## 2024-11-06 DIAGNOSIS — Z86.39 PERSONAL HISTORY OF OTHER ENDOCRINE, NUTRITIONAL AND METABOLIC DISEASE: ICD-10-CM

## 2024-11-06 DIAGNOSIS — R79.9 ABNORMAL FINDING OF BLOOD CHEMISTRY, UNSPECIFIED: ICD-10-CM

## 2024-11-06 DIAGNOSIS — R68.89 OTHER GENERAL SYMPTOMS AND SIGNS: ICD-10-CM

## 2024-11-06 DIAGNOSIS — N93.9 ABNORMAL UTERINE AND VAGINAL BLEEDING, UNSPECIFIED: ICD-10-CM

## 2024-11-06 DIAGNOSIS — D64.9 ANEMIA, UNSPECIFIED: ICD-10-CM

## 2024-11-06 DIAGNOSIS — E66.3 OVERWEIGHT: ICD-10-CM

## 2024-11-06 DIAGNOSIS — Z79.899 OTHER LONG TERM (CURRENT) DRUG THERAPY: ICD-10-CM

## 2024-11-06 DIAGNOSIS — D21.9 BENIGN NEOPLASM OF CONNECTIVE AND OTHER SOFT TISSUE, UNSPECIFIED: ICD-10-CM

## 2024-11-06 PROCEDURE — G2211 COMPLEX E/M VISIT ADD ON: CPT | Mod: NC

## 2024-11-06 PROCEDURE — 99204 OFFICE O/P NEW MOD 45 MIN: CPT

## 2024-11-11 ENCOUNTER — APPOINTMENT (OUTPATIENT)
Dept: NEPHROLOGY | Facility: CLINIC | Age: 44
End: 2024-11-11

## 2025-01-15 PROBLEM — Z02.89 ENCOUNTER FOR PHYSICAL EXAMINATION RELATED TO EMPLOYMENT: Status: RESOLVED | Noted: 2024-10-30 | Resolved: 2025-01-15

## 2025-01-15 PROBLEM — N92.1 MENORRHAGIA WITH IRREGULAR CYCLE: Status: RESOLVED | Noted: 2024-02-01 | Resolved: 2025-01-15

## 2025-01-15 PROBLEM — R39.9 UTI SYMPTOMS: Status: RESOLVED | Noted: 2024-08-26 | Resolved: 2025-01-15

## 2025-01-16 ENCOUNTER — APPOINTMENT (OUTPATIENT)
Dept: INTERNAL MEDICINE | Facility: CLINIC | Age: 45
End: 2025-01-16
Payer: COMMERCIAL

## 2025-01-16 ENCOUNTER — NON-APPOINTMENT (OUTPATIENT)
Age: 45
End: 2025-01-16

## 2025-01-16 VITALS
OXYGEN SATURATION: 94 % | HEART RATE: 72 BPM | WEIGHT: 159.25 LBS | HEIGHT: 61 IN | BODY MASS INDEX: 30.06 KG/M2 | SYSTOLIC BLOOD PRESSURE: 128 MMHG | DIASTOLIC BLOOD PRESSURE: 78 MMHG | TEMPERATURE: 98.3 F

## 2025-01-16 DIAGNOSIS — Z02.89 ENCOUNTER FOR OTHER ADMINISTRATIVE EXAMINATIONS: ICD-10-CM

## 2025-01-16 DIAGNOSIS — R39.9 UNSPECIFIED SYMPTOMS AND SIGNS INVOLVING THE GENITOURINARY SYSTEM: ICD-10-CM

## 2025-01-16 DIAGNOSIS — N92.1 EXCESSIVE AND FREQUENT MENSTRUATION WITH IRREGULAR CYCLE: ICD-10-CM

## 2025-01-16 DIAGNOSIS — Z00.00 ENCOUNTER FOR GENERAL ADULT MEDICAL EXAMINATION W/OUT ABNORMAL FINDINGS: ICD-10-CM

## 2025-01-16 DIAGNOSIS — D64.9 ANEMIA, UNSPECIFIED: ICD-10-CM

## 2025-01-16 DIAGNOSIS — N93.9 ABNORMAL UTERINE AND VAGINAL BLEEDING, UNSPECIFIED: ICD-10-CM

## 2025-01-16 DIAGNOSIS — R92.30 DENSE BREASTS, UNSPECIFIED: ICD-10-CM

## 2025-01-16 DIAGNOSIS — E03.9 HYPOTHYROIDISM, UNSPECIFIED: ICD-10-CM

## 2025-01-16 DIAGNOSIS — N60.09 SOLITARY CYST OF UNSPECIFIED BREAST: ICD-10-CM

## 2025-01-16 PROCEDURE — 99396 PREV VISIT EST AGE 40-64: CPT

## 2025-01-16 PROCEDURE — 36415 COLL VENOUS BLD VENIPUNCTURE: CPT

## 2025-01-16 RX ORDER — DROSPIRENONE 4 MG/1
4 TABLET, FILM COATED ORAL
Refills: 0 | Status: ACTIVE | COMMUNITY

## 2025-01-17 ENCOUNTER — TRANSCRIPTION ENCOUNTER (OUTPATIENT)
Age: 45
End: 2025-01-17

## 2025-01-17 LAB
ALBUMIN SERPL ELPH-MCNC: 4.3 G/DL
ALP BLD-CCNC: 84 U/L
ALT SERPL-CCNC: 20 U/L
ANION GAP SERPL CALC-SCNC: 10 MMOL/L
AST SERPL-CCNC: 17 U/L
BASOPHILS # BLD AUTO: 0.06 K/UL
BASOPHILS NFR BLD AUTO: 1 %
BILIRUB SERPL-MCNC: 0.3 MG/DL
BUN SERPL-MCNC: 10 MG/DL
CALCIUM SERPL-MCNC: 9.7 MG/DL
CHLORIDE SERPL-SCNC: 103 MMOL/L
CHOLEST SERPL-MCNC: 162 MG/DL
CO2 SERPL-SCNC: 25 MMOL/L
CREAT SERPL-MCNC: 0.72 MG/DL
EGFR: 104 ML/MIN/1.73M2
EOSINOPHIL # BLD AUTO: 0.14 K/UL
EOSINOPHIL NFR BLD AUTO: 2.3 %
FERRITIN SERPL-MCNC: 70 NG/ML
GLUCOSE SERPL-MCNC: 83 MG/DL
HCT VFR BLD CALC: 47.2 %
HDLC SERPL-MCNC: 42 MG/DL
HGB BLD-MCNC: 15.4 G/DL
IMM GRANULOCYTES NFR BLD AUTO: 0.2 %
IRON SATN MFR SERPL: 50 %
IRON SERPL-MCNC: 154 UG/DL
LDLC SERPL CALC-MCNC: 101 MG/DL
LYMPHOCYTES # BLD AUTO: 1.06 K/UL
LYMPHOCYTES NFR BLD AUTO: 17.6 %
MAN DIFF?: NORMAL
MCHC RBC-ENTMCNC: 31.8 PG
MCHC RBC-ENTMCNC: 32.6 G/DL
MCV RBC AUTO: 97.5 FL
MONOCYTES # BLD AUTO: 0.48 K/UL
MONOCYTES NFR BLD AUTO: 8 %
NEUTROPHILS # BLD AUTO: 4.27 K/UL
NEUTROPHILS NFR BLD AUTO: 70.9 %
NONHDLC SERPL-MCNC: 120 MG/DL
PLATELET # BLD AUTO: 327 K/UL
POTASSIUM SERPL-SCNC: 4.7 MMOL/L
PROT SERPL-MCNC: 7 G/DL
RBC # BLD: 4.84 M/UL
RBC # FLD: 12.1 %
SODIUM SERPL-SCNC: 139 MMOL/L
TIBC SERPL-MCNC: 308 UG/DL
TRIGL SERPL-MCNC: 103 MG/DL
TSH SERPL-ACNC: 1.06 UIU/ML
UIBC SERPL-MCNC: 154 UG/DL
WBC # FLD AUTO: 6.02 K/UL

## 2025-02-25 ENCOUNTER — TRANSCRIPTION ENCOUNTER (OUTPATIENT)
Age: 45
End: 2025-02-25

## 2025-02-25 ENCOUNTER — NON-APPOINTMENT (OUTPATIENT)
Age: 45
End: 2025-02-25

## 2025-02-26 ENCOUNTER — TRANSCRIPTION ENCOUNTER (OUTPATIENT)
Age: 45
End: 2025-02-26